# Patient Record
Sex: MALE | Race: WHITE | NOT HISPANIC OR LATINO | Employment: FULL TIME | URBAN - METROPOLITAN AREA
[De-identification: names, ages, dates, MRNs, and addresses within clinical notes are randomized per-mention and may not be internally consistent; named-entity substitution may affect disease eponyms.]

---

## 2017-06-21 ENCOUNTER — ALLSCRIPTS OFFICE VISIT (OUTPATIENT)
Dept: OTHER | Facility: OTHER | Age: 52
End: 2017-06-21

## 2017-12-12 ENCOUNTER — ALLSCRIPTS OFFICE VISIT (OUTPATIENT)
Dept: OTHER | Facility: OTHER | Age: 52
End: 2017-12-12

## 2017-12-12 DIAGNOSIS — L98.9 DISORDER OF SKIN OR SUBCUTANEOUS TISSUE: ICD-10-CM

## 2017-12-13 NOTE — PROGRESS NOTES
Assessment  1  Seborrheic keratosis (702 19) (L82 1)   2  Screening for skin condition (V82 0) (Z13 89)   3  Changing skin lesion (709 9) (L98 9)   4  H/O nonmelanoma skin cancer (V10 83) (Z85 828)    Plan     · Wound care as instructed ; Status:Complete;   Done: 09BYS1710   · (1) TISSUE EXAM; Status:Active - Retrospective By Protocol Authorization; Requestedfor:[id number]; B : right neck  B Date/Time: : 27BIZ3862  B : Skin Shave  A : right scalp  A Date/Time: : 33SON9172  A : Skin Shave  Impression: : hunt     · Use a sun block product with an SPF of 15 or more ; Status:Complete;   Done:10Boh5569   · Follow-up visit in 6 months Evaluation and Treatment  Follow-up  Status: Hold For -Scheduling  Requested for: 77Bng9894    Discussion/Summary  Discussion Summary- [facility] Derm:  Assessment #1: Skin lesions  Care Plan:  Probable basal cell carcinomas lesion on the scalp would require Mohs 1 on the neck probably could be excised here  Assessment #2: Seborrheic keratosis  Care Plan:  Patient reassured these are normal growths acquire with age no treatment needed  Assessment #3: History skin cancer  Care Plan:  No recurrence nothing else atypical sunblock recommended  Assessment #4: Screening for dermatologic disorders  Care Plan:  Nothing else noted on exam followup will continue to be more often once we get the results of the biopsies  Chief Complaint  Chief Complaint Free Text Note Form: 6 month check up      History of Present Illness  HPI: 76-year-old male with previous history of multiple skin cancers and Mohs surgery presents for overall checkup no specific complaints noted      Review of Systems  Complete Male Dermatology [address] [place] [address] [address] Patient:  Constitutional: Denies constitutional symptoms  Eyes: Denies eye symptoms  ENT:  denies ear symptoms, nasal symptoms, mouth or throat symptoms  Cardiovascular: Denies cardiovascular symptoms  Respiratory: Denies respiratory symptoms    Gastrointestinal: Denies gastrointestinal symptoms  Musculoskeletal: Denies musculoskeletal symptoms  Integumentary: Denies skin, hair and nail symptoms  Neurological: Denies neurologic symptoms  Psychiatric: Denies psychiatric symptoms  Endocrine: Denies endocrine symptoms  Hematologic/Lymphatic: Denies hematologic symptoms  Active Problems  1  Actinic keratosis (702 0) (L57 0)   2  Basal cell carcinoma of chest wall (173 51) (C44 519)   3  Basal cell carcinoma of chas of right ear (173 21) (C44 212)   4  Basal cell carcinoma of face (173 31) (C44 310)   5  Basal cell carcinoma of left shoulder (173 61) (C44 619)   6  Basal cell carcinoma of scalp (173 41) (C44 41)   7  Basal cell carcinoma, arm (173 61) (C44 611)   8  Changing skin lesion (709 9) (L98 9)   9  Diabetes Mellitus (250 00)   10  Hyperlipidemia (272 4) (E78 5)   11  Hypertension (401 9) (I10)   12  Screening for skin condition (V82 0) (Z13 89)   13  Seborrheic keratosis (702 19) (L82 1)   14  Skin lesion (709 9) (L98 9)   15  Skin lesion (709 9) (L98 9)   16  Squamous cell cancer of skin of left cheek (173 32) (C44 329)   17  Squamous cell carcinoma of scalp (173 42) (C44 42)    Past Medical History  1  History of Basal cell carcinoma of face (173 31) (C44 310)   2  History of Basal Cell Carcinoma Of Skin Of Trunk (173 51)   3  History of Basal Cell Carcinoma Of The Skin Of The Neck (173 41)   4  History of Benign neoplasm of skin of trunk (216 5) (D23 5)   5  History of Fibrotic Scar (709 2)   6  H/O nonmelanoma skin cancer (V10 83) (Z85 828)   7  History of Malignant Neoplasm Of Skin Of Lower Extremities (173 70)   8  History of Squamous Cell Carcinoma Of The Skin Of The Neck (173 42)  Past Medical History Reviewed- Derm:   The past medical history was reviewed  Surgical History  1  History of Excision Of Lesion Arms Malignant   2  History of Excision Of Lesion Face Malignant   3  History of Excision Of Lesion Legs Malignant   4   History of Excision Of Lesion Neck Malignant   5  History of Excision Of Lesion Nose Malignant   6  History of Excision Of Lesion Scalp Malignant   7  History of Excision Of Lesion Shoulders Malignant   8  History of Excision Of Lesion Trunk Malignant   9  History of Mohs Micrographic Surgery Head   10  History of Mohs Micrographic Surgery Nose Right Side   11  History of Mohs Micrographic Surgery Nose Right Side  Surgical History Reviewed [address] [place] [address] [address] Derm:   Surgical History reviewed      Family History  Family History    1  Family history of Malignant Melanoma Of The Skin (V16 8)  Family History Reviewed- Derm:   Family History was reviewed      Social History     · Never A Smoker  Social History Reviewed [facility]- Derm: The social history was reviewed      Current Meds   1  AmLODIPine Besylate 10 MG Oral Tablet; Therapy: 33FLV5143 to Recorded   2  Atenolol 50 MG Oral Tablet; Therapy: 74Mbf8881 to Recorded   3  BD Pen Needle Mini U/F 31G X 5 MM Miscellaneous; Therapy: 49JBX4572 to Recorded   4  Cialis 5 MG Oral Tablet; Therapy: 14Gwx8790 to Recorded   5  Farxiga 10 MG Oral Tablet Recorded   6  Fluorouracil 5 % External Cream; APPLY A THIN LAYER TO AFFECTED AREA L cheek TWICE DAILY; Therapy: 39MAO9698 to (Evaluate:20Jun2016)  Requested for: 00USN8184; Last Rx:44Ahh4482 Ordered   7  HumaLOG KwikPen 100 UNIT/ML Subcutaneous Solution Pen-injector; Therapy: 31Qes4565-(Evaluate:12Eze5047) Recorded   8  HumaLOG Mix 75/25 (75-25) 100 UNIT/ML Subcutaneous Suspension Recorded   9  Klor-Con M20 20 MEQ Oral Tablet Extended Release; Therapy: 83XWQ2369 to Recorded   10  Levemir FlexTouch 100 UNIT/ML Subcutaneous Solution Pen-injector; Therapy: 74VUS5038 to Recorded   11  MetFORMIN HCl  MG Oral Tablet Extended Release 24 Hour; Therapy: 65YBS9098 to Recorded   12  Pravastatin Sodium 20 MG Oral Tablet; Therapy: 91Mwj3408 to Recorded   13  Quinapril-Hydrochlorothiazide 20-12 5 MG Oral Tablet;   Therapy: 53WYR1620 to Recorded  Medication List Reviewed: The medication list was reviewed and updated today  Allergies    1  No Known Drug Allergies    Physical Exam   Constitutional  General appearance: Appears healthy and well developed  Lymphatic  No visible disturbance  Musculoskeletal  Digits and nails: No clubbing, cyanosis or edema  Cutaneous and nail exam normal    Skin  Scalp skin texture and hair distribution: Normal skin texture on scalp, normal hair distribution  Head: Abnormal    Neck: Normal turgor, no rashes, no lesions  Chest: Normal turgor, no rashes, no lesions  Abdomen: Normal turgor, no rashes, no lesions  Back: Normal turgor, no rashes, no lesions  Right upper extremity: Normal turgor, no rashes, no lesions  Left upper extremity: Normal turgor, no rashes, no lesions  Right lower extremity: Normal turgor, no rashes, no lesions  Left lower extremity: Normal turgor, no rashes, no lesions  Neuro/Psych  Alert and oriented x 3  Displays comfort and cooperation during encounterl  Affect is normal    Finding 8mm pearly macule R parietal scalp  5mm pearly macule R post auricular neck previous sites of skin cancer well healed normal keratotic papules with greasy stuck on appearance nothing else remarkable noted on complete exam       Procedure   Procedure: skin biopsy  Indications for the procedure include basal cell carcinoma suspected  Risks, benefits, alternatives, infection risk, bleeding risk, risk of allergic reaction and risk of scarring were discussed with the patient--   verbal consent was obtained prior to the procedure  Procedure Note:   Anesthesia: 1 ml of lidocaine 1% with epinephrine  The lesion was located on the Right anterior scalp  The patient was prepped using alcohol  a shave biopsy of the lesion was taken  The hemostasis of the wound was achieved with aluminum chloride  Dressing: The wound was cleaned and petroleum jelly was applied and a sterile compression dressing was placed    Specimen: the excised lesion was place in buffered formalin and sent for pathology  Skin Lesion #2:  Indications for the procedure include basal cell carcinoma suspected  Procedure Note: Anesthesia: 1 ml of lidocaine 1% without epinephrine  The lesion was located on the Right postauricular neck  The patient was prepped using alcohol  a shave biopsy of the lesion was taken  The hemostasis of the wound was achieved with electric cauterization and aluminum chloride  Dressing: The wound was cleaned and petroleum jelly was applied and a sterile compression dressing was placed  Specimen: the excised lesion was place in buffered formalin and sent for pathology  Post-Procedure:  Patient Status: the patient tolerated the procedure well  Complications: there were no complications  Follow-up in the office  patient will be called in event skin cancer is found  -- Patient can call the office in 7-10 days for results if not contacted  Future Appointments    Date/Time Provider Specialty Site   06/11/2018 11:20 AM ABBE Carson   [facility]       Signatures   Electronically signed by : Jillene Siemens, M D ; Dec 12 2017 12:16PM EST                       (Author)

## 2018-01-23 NOTE — PROCEDURES
Chief Complaint  SKIN CANCER SURGERY      History of Present Illness  HPI- Derm: 49-year-old male with previous history of skin cancer presents for followup for treatment of presumed basal cell carcinomas on his scalp and chest      Current Meds   1  AmLODIPine Besylate 10 MG Oral Tablet; Therapy: 27OSB7116 to Recorded   2  Atenolol 50 MG Oral Tablet; Therapy: 65Udf0624 to Recorded   3  BD Pen Needle Mini U/F 31G X 5 MM Miscellaneous; Therapy: 15VCM2524 to Recorded   4  Cialis 5 MG Oral Tablet; Therapy: 27Val5618 to Recorded   5  Farxiga 10 MG Oral Tablet Recorded   6  Farxiga 5 MG Oral Tablet; Therapy: 87GBA0986 to Recorded   7  HumaLOG KwikPen 100 UNIT/ML Subcutaneous Solution Pen-injector; Therapy: 73Obk3651-(Evaluate:97Jor1662) Recorded   8  HumaLOG Mix 75/25 (75-25) 100 UNIT/ML Subcutaneous Suspension Recorded   9  Klor-Con M20 20 MEQ Oral Tablet Extended Release; Therapy: 02WPO5652 to Recorded   10  Levemir FlexTouch 100 UNIT/ML Subcutaneous Solution Pen-injector; Therapy: 12OLF2958 to Recorded   11  MetFORMIN HCl  MG Oral Tablet Extended Release 24 Hour; Therapy: 09SCH7910 to Recorded   12  Pravastatin Sodium 20 MG Oral Tablet; Therapy: 83Ahx9121 to Recorded   13  Quinapril-Hydrochlorothiazide 20-12 5 MG Oral Tablet; Therapy: 45UTW9994 to Recorded    Allergies    1  No Known Drug Allergies    Physical Exam    Constitutional   General appearance: Appears healthy and well developed  Lymphatic   No visible disturbance  Musculoskeletal   Digits and nails: No clubbing, cyanosis or edema  Cutaneous and nail exam normal     Neuro/Psych   Alert and oriented x 3  Displays comfort and cooperation during encounterl  Affect is normal     Finding Several lesions noted on the left scalp and right chest wall measuring less than 4 mm in size  Procedure    Procedure: electrical destruction and cauterization of lesion     Risks, benefits, alternatives, infection risk, bleeding risk, risk of allergic reaction and the risk of scarring were discussed with the patient   verbal consent was obtained prior to the procedure  Procedure Note:   Anesthesia: 1 ml of lidocaine 1% with epinephrine  Left frontal scalp   Lesion size is 3 mm  The patient was prepped using alcohol  Destruction Technique: Curettage and electrodessication X3 performed with destruction with electrical cauterization application blank space defect  Defect size: 3 5mm  Dressing: The wound was cleaned and petroleum jelly was applied and a sterile dressing was placed  Specimen: the excised lesion was place in buffered formalin and sent for pathology  Skin Lesion #2:   Indications for the procedure include Presumed basal cell carcinoma  Procedure Note:  Anesthesia: Of lidocaine 1% with epinephrine  Left temporal scalp anterior   Lesion size is 3 mm  The patient was prepped and draped in the usual sterile fashion using alcohol  Destruction Technique: destruction with electrical cauterization application  Defect size: 3 5mm  Dressing: The wound was cleaned and petroleum jelly was applied, a sterile dressing was placed and Specimen sent for biopsy  Skin Lesion #3:   Indications for the procedure include Presumed basal cell carcinoma  Procedure Note:  Anesthesia: 1 ml of lidocaine 1% with epinephrine  Left temporal scalp posterior   Lesion size is 2 mm  The patient was prepped and draped in the usual sterile fashion using alcohol  Destruction Technique: destruction with electrical cauterization application  Defect size: 3mm  Dressing: The wound was cleaned and petroleum jelly was applied, a sterile dressing was placed and Specimen sent for biopsy  Skin Lesion #4:   Indications for the procedure include Presumed basal cell carcinoma  Procedure Note:  Anesthesia: 1 ml of lidocaine 1% with epinephrine  Left parietal scalp   Lesion size is 3 mm   The patient was prepped and draped in the usual sterile fashion using alcohol  Destruction Technique: destruction with electrical cauterization application  Defect size: 4mm  Dressing: The wound was cleaned and petroleum jelly was applied, a sterile dressing was placed and Specimen sent for biopsy  Skin Lesion #5:   Indications for the procedure include Previously biopsied basal cell carcinoma  Procedure Note:  Anesthesia: 1 ml of lidocaine 1% with epinephrine  Right chest wall   Lesion size is 3 mm  The patient was prepped and draped in the usual sterile fashion using alcohol  Destruction Technique: destruction with electrical cauterization application  Defect size: 4  Dressing: The wound was cleaned and petroleum jelly was applied, a sterile dressing was placed and Specimen sent for biopsy  Post-Procedure:   Patient Status:  the patient tolerated the procedure well  Complications:  there were no complications  Assessment    1  Changing skin lesion (709 9) (L98 9)    Plan  Changing skin lesion    · Wound care as instructed ; Status:Complete;   Done: 67NWU0892   · Follow-up visit in 3 months Evaluation and Treatment  Follow-up  Status: Complete   Done: 29RXF8324  Skin lesion    · (1) TISSUE EXAM; Status:Active; Requested RSY:[id number];   Impression? : BCC  Sources(s) : Skin, Other    Discussion/Summary    Lesions x5 treated for resumed cutaneous malignancy await result of biopsy to confirm this will recheck in 3 months        Signatures   Electronically signed by : ABBE Wilde ; Jan 26 2016  5:15PM EST                       (Author)

## 2018-02-06 ENCOUNTER — PROCEDURE VISIT (OUTPATIENT)
Dept: DERMATOLOGY | Facility: CLINIC | Age: 53
End: 2018-02-06
Payer: COMMERCIAL

## 2018-02-06 DIAGNOSIS — C44.41 BASAL CELL CARCINOMA OF RIGHT SIDE OF NECK: ICD-10-CM

## 2018-02-06 DIAGNOSIS — C44.41 BASAL CELL CARCINOMA OF SCALP: Primary | ICD-10-CM

## 2018-02-06 PROCEDURE — 17272 DSTR MAL LES S/N/H/F/G 1.1-2: CPT | Performed by: DERMATOLOGY

## 2018-02-06 RX ORDER — METFORMIN HYDROCHLORIDE 500 MG/1
TABLET, EXTENDED RELEASE ORAL
COMMUNITY
Start: 2014-09-24 | End: 2019-06-30 | Stop reason: ALTCHOICE

## 2018-02-06 RX ORDER — TADALAFIL 5 MG/1
TABLET ORAL
COMMUNITY
Start: 2014-09-18 | End: 2019-06-30 | Stop reason: ALTCHOICE

## 2018-02-06 RX ORDER — FLUOROURACIL 50 MG/G
CREAM TOPICAL
COMMUNITY
Start: 2016-05-31 | End: 2021-07-25

## 2018-02-06 RX ORDER — PRAVASTATIN SODIUM 20 MG
TABLET ORAL
COMMUNITY
Start: 2014-09-18

## 2018-02-06 RX ORDER — AMLODIPINE BESYLATE 10 MG/1
TABLET ORAL
COMMUNITY
Start: 2014-01-23

## 2018-02-06 RX ORDER — ATENOLOL 50 MG/1
TABLET ORAL
COMMUNITY
Start: 2014-09-18

## 2018-02-06 RX ORDER — QUINAPRIL HCL AND HYDROCHLOROTHIAZIDE 20; 12.5 MG/1; MG/1
1 TABLET ORAL 2 TIMES DAILY
COMMUNITY
Start: 2014-01-23

## 2018-02-06 RX ORDER — POTASSIUM CHLORIDE 20 MEQ/1
TABLET, EXTENDED RELEASE ORAL
COMMUNITY
Start: 2014-01-23 | End: 2019-06-30 | Stop reason: ALTCHOICE

## 2018-02-06 NOTE — PROGRESS NOTES
[address] [id number] [facility] DERMATOLOGY  [address] [address]     MRN: [id number] : 1965  Encounter: [id number]  Patient Information: Vale Jeff    Subjective:     49-year-old male presents for previously biopsied basal cell carcinoma right scalp and right neck     Objective: There were no vitals taken for this visit  Physical Exam:    General Appearance:    Alert, cooperative, no distress   Skin:    previous sites of biopsy revealed superficial appearing lesions that appear larger than previously noted 1 on the right scalp measures 15 mm in size on the 1 on the right neck measures 13 mm in size   Procedure: Curettage & Electrodessication  The reasons for the procedure were explained to the patient  The benefits and risks of the procedure were explained to the patient, including bleeding, infection, incomplete removal, prolonged anesthesia (weeks to months) and rarely nerve damage  The patient is aware that a scar will result from the procedure  The consent for the procedure was obtained verbally and in writing  Lesion Site:#1  Right scalp  Curetted Area Size (mm):  15  Lesion site #2  Right neck curetted area size 13 mm  Using a sterile curette, the appropriate area was curetted  The area was curetted and electrodesiccated x3  Final defect size:  Right scalp 16 mm                                Right neck 14 mm    The wound was left to heal by secondary intention  The wound was cleansed then covered with a dressing  Wound care instructions were verbally given and in writing  I performed the entire procedure  Patient tolerated procedure well  Assessment:     1  Basal cell carcinoma of scalp     2   Basal cell carcinoma of right side of neck           Plan:   Since lesions appear quite superficial the large we elected to go ahead and see if we can be able to eradicate this with curettage electrodesiccation will check closely for recurrence and recheck in 1 month      Prior to Admission medications    Medication Sig Start Date End Date Taking? Authorizing Provider   amLODIPine (NORVASC) 10 mg tablet Take by mouth 1/23/14  Yes Historical Provider, MD   atenolol (TENORMIN) 50 mg tablet Take by mouth 9/18/14  Yes Historical Provider, MD   fluorouracil (EFUDEX) 5 % cream Apply topically 5/31/16  Yes Historical Provider, MD   insulin detemir (LEVEMIR FLEXTOUCH) subcutaneous injection pen 100 units/mL Inject under the skin 3/26/14  Yes Historical Provider, MD   insulin lispro (HUMALOG KWIKPEN) 100 Units/mL SOPN Inject under the skin 2/12/15  Yes Historical Provider, MD   Insulin Pen Needle (B-D UF III MINI PEN NEEDLES) 31G X 5 MM MISC by Does not apply route 10/2/14  Yes Historical Provider, MD   metFORMIN (GLUCOPHAGE-XR) 500 mg 24 hr tablet Take by mouth 9/24/14  Yes Historical Provider, MD   potassium chloride (KLOR-CON M20) 20 mEq tablet Take by mouth 1/23/14  Yes Historical Provider, MD   pravastatin (PRAVACHOL) 20 mg tablet Take by mouth 9/18/14  Yes Historical Provider, MD   quinapril-hydrochlorothiazide (ACCURETIC) 20-12 5 MG per tablet Take by mouth 1/23/14  Yes Historical Provider, MD   tadalafil (CIALIS) 5 MG tablet Take by mouth 9/18/14  Yes Historical Provider, MD   Dapagliflozin Propanediol (FARXIGA) 10 MG TABS Take by mouth    Historical Provider, MD   insulin lispro protamine-insulin lispro (HUMALOG MIX 75/25) 100 units/mL Inject under the skin    Historical Provider, MD     No Known Allergies    Elton Reese MD  2/6/2018,10:34 AM    Portions of the record may have been created with voice recognition software   Occasional wrong word or "sound a like" substitutions may have occurred due to the inherent limitations of voice recognition software   Read the chart carefully and recognize, using context, where substitutions have occurred

## 2018-03-12 ENCOUNTER — CLINICAL SUPPORT (OUTPATIENT)
Dept: DERMATOLOGY | Facility: CLINIC | Age: 53
End: 2018-03-12

## 2018-03-12 DIAGNOSIS — C44.41 BASAL CELL CARCINOMA OF SCALP: Primary | ICD-10-CM

## 2018-03-12 DIAGNOSIS — C44.41 BASAL CELL CARCINOMA OF RIGHT SIDE OF NECK: ICD-10-CM

## 2018-03-12 PROCEDURE — 99024 POSTOP FOLLOW-UP VISIT: CPT | Performed by: DERMATOLOGY

## 2018-03-12 NOTE — PROGRESS NOTES
[address] [facility] [address] [facility] DERMATOLOGY  [address] [address]     MRN: [id number] : 1965  Encounter: [id number]  Patient Information: Lupe Jeff    Subjective:     69-year-old male presents for follow-up for previously curetted basal cell carcinomas on the scalp and right neck no problems noted     Objective: There were no vitals taken for this visit  Physical Exam:    General Appearance:    Alert, cooperative, no distress   Skin:    Previous sites of  Curettage well-healed without evidence of disease     Assessment:     1  Basal cell carcinoma of scalp     2  Basal cell carcinoma of right side of neck           Plan:    no further treatment needed follow-up as previously scheduled in       Prior to Admission medications    Medication Sig Start Date End Date Taking?  Authorizing Provider   amLODIPine (NORVASC) 10 mg tablet Take by mouth 14   Historical Provider, MD   atenolol (TENORMIN) 50 mg tablet Take by mouth 14   Historical Provider, MD   Dapagliflozin Propanediol (FARXIGA) 10 MG TABS Take by mouth    Historical Provider, MD   fluorouracil (EFUDEX) 5 % cream Apply topically 16   Historical Provider, MD   insulin detemir (LEVEMIR FLEXTOUCH) subcutaneous injection pen 100 units/mL Inject under the skin 3/26/14   Historical Provider, MD   insulin lispro (HUMALOG KWIKPEN) 100 Units/mL SOPN Inject under the skin 2/12/15   Historical Provider, MD   insulin lispro protamine-insulin lispro (HUMALOG MIX 75/25) 100 units/mL Inject under the skin    Historical Provider, MD   Insulin Pen Needle (B-D UF III MINI PEN NEEDLES) 31G X 5 MM MISC by Does not apply route 10/2/14   Historical Provider, MD   metFORMIN (GLUCOPHAGE-XR) 500 mg 24 hr tablet Take by mouth 14   Historical Provider, MD   potassium chloride (KLOR-CON M20) 20 mEq tablet Take by mouth 14   Historical Provider, MD   pravastatin (PRAVACHOL) 20 mg tablet Take by mouth 9/18/14   Historical Provider, MD   quinapril-hydrochlorothiazide (ACCURETIC) 20-12 5 MG per tablet Take by mouth 1/23/14   Historical Provider, MD   tadalafil (CIALIS) 5 MG tablet Take by mouth 9/18/14   Historical Provider, MD     No Known Allergies    Sara Sanchez MD  3/12/2018,11:32 AM    Portions of the record may have been created with voice recognition software   Occasional wrong word or "sound a like" substitutions may have occurred due to the inherent limitations of voice recognition software   Read the chart carefully and recognize, using context, where substitutions have occurred

## 2019-02-06 ENCOUNTER — OFFICE VISIT (OUTPATIENT)
Dept: DERMATOLOGY | Facility: CLINIC | Age: 54
End: 2019-02-06
Payer: COMMERCIAL

## 2019-02-06 DIAGNOSIS — L98.9 UNKNOWN SKIN LESION: Primary | ICD-10-CM

## 2019-02-06 DIAGNOSIS — Z13.89 SCREENING FOR SKIN CONDITION: ICD-10-CM

## 2019-02-06 DIAGNOSIS — Z85.828 HISTORY OF SKIN CANCER: ICD-10-CM

## 2019-02-06 DIAGNOSIS — L82.1 SEBORRHEIC KERATOSIS: ICD-10-CM

## 2019-02-06 PROCEDURE — 88305 TISSUE EXAM BY PATHOLOGIST: CPT | Performed by: PATHOLOGY

## 2019-02-06 PROCEDURE — 11103 TANGNTL BX SKIN EA SEP/ADDL: CPT | Performed by: DERMATOLOGY

## 2019-02-06 PROCEDURE — 99213 OFFICE O/P EST LOW 20 MIN: CPT | Performed by: DERMATOLOGY

## 2019-02-06 PROCEDURE — 11102 TANGNTL BX SKIN SINGLE LES: CPT | Performed by: DERMATOLOGY

## 2019-02-06 NOTE — PATIENT INSTRUCTIONS
Skin lesion probable basal cell carcinomas will require Mohs especially lesion left nasal tip and possibly nasal bridge will discuss once we have the results of biopsy also need to possibly consider systemic therapy patient with multiple lesions on his torso as well which will plan on treatment with curettage and biopsy in the future once the more serious lesions on the face will be taking care of  Seborrheic keratosis patient reassured these are normal growths we acquire with age no treatment needed  History of skin cancer in no recurrence nothing else atypical sunblock recommended follow-up in 3 months  Screening for dermatologic disorders nothing else of concern noted on complete exam follow-up in 3 months  Wound care instructions given to patient

## 2019-02-06 NOTE — PROGRESS NOTES
[facility] (formerly [facility])[3] DERMATOLOGY  [address]  [facility] [address]  [phone]  [phone]     MRN: [id number] : 1965  Encounter: [id number]  Patient Information: Manuel Dixon  Chief complaint:  Skin cancer checkup    History of present illness:  47-year-old male with multiple skin cancers previously presents after almost a year with numerous lesions that have developed  History reviewed  No pertinent past medical history  History reviewed  No pertinent surgical history  Social History   History   Alcohol Use    Yes     Comment: socially     History   Drug Use No     History   Smoking Status    Never Smoker   Smokeless Tobacco    Never Used     History reviewed  No pertinent family history  Meds/Allergies   No Known Allergies    Meds:  Prior to Admission medications    Medication Sig Start Date End Date Taking?  Authorizing Provider   amLODIPine (NORVASC) 10 mg tablet Take by mouth 14  Yes Historical Provider, MD   atenolol (TENORMIN) 50 mg tablet Take by mouth 14  Yes Historical Provider, MD   Dapagliflozin Propanediol (FARXIGA) 10 MG TABS Take by mouth   Yes Historical Provider, MD   insulin detemir (LEVEMIR FLEXTOUCH) subcutaneous injection pen 100 units/mL Inject under the skin 3/26/14  Yes Historical Provider, MD   insulin lispro (HUMALOG KWIKPEN) 100 Units/mL SOPN Inject under the skin 2/12/15  Yes Historical Provider, MD   insulin lispro protamine-insulin lispro (HUMALOG MIX 75/25) 100 units/mL Inject under the skin   Yes Historical Provider, MD   Insulin Pen Needle (B-D UF III MINI PEN NEEDLES) 31G X 5 MM MISC by Does not apply route 10/2/14  Yes Historical Provider, MD   metFORMIN (GLUCOPHAGE-XR) 500 mg 24 hr tablet Take by mouth 14  Yes Historical Provider, MD   pravastatin (PRAVACHOL) 20 mg tablet Take by mouth 14  Yes Historical Provider, MD   quinapril-hydrochlorothiazide (ACCURETIC) 20-12 5 MG per tablet Take by mouth 1/23/14  Yes Historical Provider, MD   tadalafil (CIALIS) 5 MG tablet Take by mouth 9/18/14  Yes Historical Provider, MD   fluorouracil (EFUDEX) 5 % cream Apply topically 5/31/16   Historical Provider, MD   potassium chloride (KLOR-CON M20) 20 mEq tablet Take by mouth 1/23/14   Historical Provider, MD       Subjective:     Review of Systems:    General: negative for - chills, fatigue, fever,  weight gain or weight loss  Psychological: negative for - anxiety, behavioral disorder, concentration difficulties, decreased libido, depression, irritability, memory difficulties, mood swings, sleep disturbances or suicidal ideation  ENT: negative for - hearing difficulties , nasal congestion, nasal discharge, oral lesions, sinus pain, sneezing, sore throat  Allergy and Immunology: negative for - hives, insect bite sensitivity,  Hematological and Lymphatic: negative for - bleeding problems, blood clots,bruising, swollen lymph nodes  Endocrine: negative for - hair pattern changes, hot flashes, malaise/lethargy, mood swings, palpitations, polydipsia/polyuria, skin changes, temperature intolerance or unexpected weight change  Respiratory: negative for - cough, hemoptysis, orthopnea, shortness of breath, or wheezing  Cardiovascular: negative for - chest pain, dyspnea on exertion, edema,  Gastrointestinal: negative for - abdominal pain, nausea/vomiting  Genito-Urinary: negative for - dysuria, incontinence, irregular/heavy menses or urinary frequency/urgency  Musculoskeletal: negative for - gait disturbance, joint pain, joint stiffness, joint swelling, muscle pain, muscular weakness  Dermatological:  As in HPI  Neurological: negative for confusion, dizziness, headaches, impaired coordination/balance, memory loss, numbness/tingling, seizures, speech problems, tremors or weakness       Objective: There were no vitals taken for this visit      Physical Exam:    General Appearance:    Alert, cooperative, no distress   Head: Normocephalic, without obvious abnormality, atraumatic           Skin:   A full skin exam was performed including scalp, head scalp, eyes, ears, nose, lips, neck, chest, axilla, abdomen, back, buttocks, bilateral upper extremities, bilateral lower extremities, hands, feet, fingers, toes, fingernails, and toenails 4 mm pearly papule noted left alar rim 5 mm pearly nodule noted on the left nasal bridge 5 mm pearly macule noted on the left upper parietal scalp 5 mm area noted on the frontal scalp 4 mm area noted on the left temple and 5 mm area on the right cheek numerous other lesions of note on his torso as well normal keratotic papules greasy stuck on appearance nothing else of concern noted                  Shave Biopsy Procedure Note    Pre-operative Diagnosis:  Rule out basal cell carcinoma    Plan:  1  Instructed to keep the wound dry and covered for 24 and clean thereafter  2  Warning signs of infection were reviewed  3  Recommended that the patient use OTC acetaminophen as needed for pain  4  Return  Pending results of biopsy(ies)    Locations:  Left nasal ala rim left nasal bridge left parietal scalp frontal scalp right temple and right cheek    Indications:  Suspicious lesions    Anesthesia: Lidocaine 1% without epinephrine without added sodium bicarbonate    Procedure Details     Patient informed of the risks (including bleeding and infection) and benefits of the   procedure and Verbal informed consent obtained  The lesion and surrounding area were given a sterile prep using alcohol and draped in the usual sterile fashion  A Blue blade razor was used to obtain a specimen  Hemostasis achieved with aluminum chloride  Petrolatum and a sterile dressing applied  The specimen was sent for pathologic examination  The patient tolerated the procedure(s) well  Complications:  none  Assessment:     1  Unknown skin lesion     2  Screening for skin condition     3  History of skin cancer     4  Seborrheic keratosis           Plan:   Skin lesion probable basal cell carcinomas will require Mohs especially lesion left nasal tip and possibly nasal bridge will discuss once we have the results of biopsy also need to possibly consider systemic therapy patient with multiple lesions on his torso as well which will plan on treatment with curettage and biopsy in the future once the more serious lesions on the face will be taking care of  Seborrheic keratosis patient reassured these are normal growths we acquire with age no treatment needed  History of skin cancer in no recurrence nothing else atypical sunblock recommended follow-up in 3 months  Screening for dermatologic disorders nothing else of concern noted on complete exam follow-up in 3 months    Norman Morales MD  2/6/2019,8:16 AM    Portions of the record may have been created with voice recognition software   Occasional wrong word or "sound a like" substitutions may have occurred due to the inherent limitations of voice recognition software   Read the chart carefully and recognize, using context, where substitutions have occurred

## 2019-03-13 ENCOUNTER — PROCEDURE VISIT (OUTPATIENT)
Dept: DERMATOLOGY | Facility: CLINIC | Age: 54
End: 2019-03-13
Payer: COMMERCIAL

## 2019-03-13 DIAGNOSIS — C44.310 BASAL CELL CARCINOMA (BCC) OF SKIN OF FACE, UNSPECIFIED PART OF FACE: Primary | ICD-10-CM

## 2019-03-13 PROCEDURE — 99212 OFFICE O/P EST SF 10 MIN: CPT | Performed by: DERMATOLOGY

## 2019-03-13 NOTE — PROGRESS NOTES
[address]  [address]  [facility] [address]  [phone]  [phone]     MRN: [id number] : 1965  Encounter: [id number]  Patient Information: Sky Jeff    Subjective:     78-year-old male presents follow-up secondary to previous history of basal cell carcinomas at last visit we had biopsied 6 lesions on his face and scalp all all positive for basal cell carcinoma  Patient presented today for planned excision of some of these lesions andprobable referral for Mohs     Objective: There were no vitals taken for this visit  Physical Exam:    General Appearance:    Alert, cooperative, no distress   Skin:  On further examination of the scalp and face numerous lesions seen probably greater than 20  Assessment:     1  Basal cell carcinoma (BCC) of skin of face, unspecified part of face           Plan:   Patient with numerous lesions on the face scalp glabella nares greater than 20 noted and does not include lesions also noted on the back and chest previously at present my feeling is I do not know exactly where on going to start with removal of the lesions and concerned that each 1 will have margin involvement or another lesion in the specimen  At present my feeling patient needs either Mohs surgery for individual lesions or consideration for [place]  Will discuss this further with Dr Falguni Flores  Prior to Admission medications    Medication Sig Start Date End Date Taking?  Authorizing Provider   amLODIPine (NORVASC) 10 mg tablet Take by mouth 14   Historical Provider, MD   atenolol (TENORMIN) 50 mg tablet Take by mouth 14   Historical Provider, MD   Dapagliflozin Propanediol (FARXIGA) 10 MG TABS Take by mouth    Historical Provider, MD   fluorouracil (EFUDEX) 5 % cream Apply topically 16   Historical Provider, MD   insulin detemir (LEVEMIR FLEXTOUCH) subcutaneous injection pen 100 units/mL Inject under the skin 3/26/14 Historical Provider, MD   insulin lispro (HUMALOG KWIKPEN) 100 Units/mL SOPN Inject under the skin 2/12/15   Historical Provider, MD   insulin lispro protamine-insulin lispro (HUMALOG MIX 75/25) 100 units/mL Inject under the skin    Historical Provider, MD   Insulin Pen Needle (B-D UF III MINI PEN NEEDLES) 31G X 5 MM MISC by Does not apply route 10/2/14   Historical Provider, MD   metFORMIN (GLUCOPHAGE-XR) 500 mg 24 hr tablet Take by mouth 9/24/14   Historical Provider, MD   potassium chloride (KLOR-CON M20) 20 mEq tablet Take by mouth 1/23/14   Historical Provider, MD   pravastatin (PRAVACHOL) 20 mg tablet Take by mouth 9/18/14   Historical Provider, MD   quinapril-hydrochlorothiazide (ACCURETIC) 20-12 5 MG per tablet Take by mouth 1/23/14   Historical Provider, MD   tadalafil (CIALIS) 5 MG tablet Take by mouth 9/18/14   Historical Provider, MD     No Known Allergies    Antonia Tomlin MD  3/13/2019,11:37 AM    Portions of the record may have been created with voice recognition software   Occasional wrong word or "sound a like" substitutions may have occurred due to the inherent limitations of voice recognition software   Read the chart carefully and recognize, using context, where substitutions have occurred

## 2019-03-20 ENCOUNTER — TELEPHONE (OUTPATIENT)
Dept: DERMATOLOGY | Facility: CLINIC | Age: 54
End: 2019-03-20

## 2019-06-29 PROCEDURE — 99284 EMERGENCY DEPT VISIT MOD MDM: CPT | Performed by: EMERGENCY MEDICINE

## 2019-06-29 PROCEDURE — 99285 EMERGENCY DEPT VISIT HI MDM: CPT

## 2019-06-30 ENCOUNTER — HOSPITAL ENCOUNTER (INPATIENT)
Facility: HOSPITAL | Age: 54
LOS: 1 days | Discharge: HOME | DRG: 440 | End: 2019-07-01
Attending: EMERGENCY MEDICINE | Admitting: INTERNAL MEDICINE
Payer: COMMERCIAL

## 2019-06-30 ENCOUNTER — APPOINTMENT (INPATIENT)
Dept: ULTRASOUND IMAGING | Facility: HOSPITAL | Age: 54
DRG: 440 | End: 2019-06-30
Payer: COMMERCIAL

## 2019-06-30 ENCOUNTER — APPOINTMENT (EMERGENCY)
Dept: CT IMAGING | Facility: HOSPITAL | Age: 54
DRG: 440 | End: 2019-06-30
Payer: COMMERCIAL

## 2019-06-30 DIAGNOSIS — K85.90 PANCREATITIS: Primary | ICD-10-CM

## 2019-06-30 PROBLEM — I10 ESSENTIAL HYPERTENSION: Status: ACTIVE | Noted: 2019-06-30

## 2019-06-30 PROBLEM — E78.5 HLD (HYPERLIPIDEMIA): Status: ACTIVE | Noted: 2019-06-30

## 2019-06-30 PROBLEM — Z79.4 TYPE 2 DIABETES MELLITUS WITH HYPERGLYCEMIA, WITH LONG-TERM CURRENT USE OF INSULIN (HCC): Status: ACTIVE | Noted: 2019-06-30

## 2019-06-30 PROBLEM — E11.65 TYPE 2 DIABETES MELLITUS WITH HYPERGLYCEMIA, WITH LONG-TERM CURRENT USE OF INSULIN (HCC): Status: ACTIVE | Noted: 2019-06-30

## 2019-06-30 LAB
ALBUMIN SERPL BCP-MCNC: 3.5 G/DL (ref 3.5–5)
ALP SERPL-CCNC: 66 U/L (ref 46–116)
ALT SERPL W P-5'-P-CCNC: 52 U/L (ref 12–78)
ANION GAP SERPL CALCULATED.3IONS-SCNC: 10 MMOL/L (ref 4–13)
ANION GAP SERPL CALCULATED.3IONS-SCNC: 9 MMOL/L (ref 4–13)
AST SERPL W P-5'-P-CCNC: 25 U/L (ref 5–45)
BACTERIA UR QL AUTO: ABNORMAL /HPF
BASOPHILS # BLD AUTO: 0.05 THOUSANDS/ΜL (ref 0–0.1)
BASOPHILS NFR BLD AUTO: 1 % (ref 0–1)
BILIRUB SERPL-MCNC: 0.7 MG/DL (ref 0.2–1)
BILIRUB UR QL STRIP: NEGATIVE
BUN SERPL-MCNC: 13 MG/DL (ref 5–25)
BUN SERPL-MCNC: 15 MG/DL (ref 5–25)
CALCIUM SERPL-MCNC: 8.7 MG/DL (ref 8.3–10.1)
CALCIUM SERPL-MCNC: 9.4 MG/DL (ref 8.3–10.1)
CHLORIDE SERPL-SCNC: 100 MMOL/L (ref 100–108)
CHLORIDE SERPL-SCNC: 97 MMOL/L (ref 100–108)
CHOLEST SERPL-MCNC: 165 MG/DL (ref 50–200)
CLARITY UR: CLEAR
CO2 SERPL-SCNC: 26 MMOL/L (ref 21–32)
CO2 SERPL-SCNC: 27 MMOL/L (ref 21–32)
COLOR UR: YELLOW
CREAT SERPL-MCNC: 0.8 MG/DL (ref 0.6–1.3)
CREAT SERPL-MCNC: 0.94 MG/DL (ref 0.6–1.3)
EOSINOPHIL # BLD AUTO: 0.19 THOUSAND/ΜL (ref 0–0.61)
EOSINOPHIL NFR BLD AUTO: 3 % (ref 0–6)
ERYTHROCYTE [DISTWIDTH] IN BLOOD BY AUTOMATED COUNT: 12.3 % (ref 11.6–15.1)
ERYTHROCYTE [DISTWIDTH] IN BLOOD BY AUTOMATED COUNT: 12.4 % (ref 11.6–15.1)
EST. AVERAGE GLUCOSE BLD GHB EST-MCNC: 203 MG/DL
GFR SERPL CREATININE-BSD FRML MDRD: 101 ML/MIN/1.73SQ M
GFR SERPL CREATININE-BSD FRML MDRD: 91 ML/MIN/1.73SQ M
GLUCOSE SERPL-MCNC: 169 MG/DL (ref 65–140)
GLUCOSE SERPL-MCNC: 273 MG/DL (ref 65–140)
GLUCOSE SERPL-MCNC: 310 MG/DL (ref 65–140)
GLUCOSE SERPL-MCNC: 310 MG/DL (ref 65–140)
GLUCOSE SERPL-MCNC: 336 MG/DL (ref 65–140)
GLUCOSE SERPL-MCNC: 382 MG/DL (ref 65–140)
GLUCOSE UR STRIP-MCNC: ABNORMAL MG/DL
HBA1C MFR BLD: 8.7 % (ref 4.2–6.3)
HCT VFR BLD AUTO: 44.1 % (ref 36.5–49.3)
HCT VFR BLD AUTO: 45.4 % (ref 36.5–49.3)
HDLC SERPL-MCNC: 37 MG/DL (ref 40–60)
HGB BLD-MCNC: 14.9 G/DL (ref 12–17)
HGB BLD-MCNC: 15.7 G/DL (ref 12–17)
HGB UR QL STRIP.AUTO: NEGATIVE
HOLD SPECIMEN: YES
IMM GRANULOCYTES # BLD AUTO: 0.06 THOUSAND/UL (ref 0–0.2)
IMM GRANULOCYTES NFR BLD AUTO: 1 % (ref 0–2)
KETONES UR STRIP-MCNC: NEGATIVE MG/DL
LDLC SERPL CALC-MCNC: 97 MG/DL (ref 0–100)
LEUKOCYTE ESTERASE UR QL STRIP: NEGATIVE
LIPASE SERPL-CCNC: 929 U/L (ref 73–393)
LYMPHOCYTES # BLD AUTO: 1.63 THOUSANDS/ΜL (ref 0.6–4.47)
LYMPHOCYTES NFR BLD AUTO: 23 % (ref 14–44)
MCH RBC QN AUTO: 29.9 PG (ref 26.8–34.3)
MCH RBC QN AUTO: 30.2 PG (ref 26.8–34.3)
MCHC RBC AUTO-ENTMCNC: 33.8 G/DL (ref 31.4–37.4)
MCHC RBC AUTO-ENTMCNC: 34.6 G/DL (ref 31.4–37.4)
MCV RBC AUTO: 87 FL (ref 82–98)
MCV RBC AUTO: 88 FL (ref 82–98)
MONOCYTES # BLD AUTO: 0.74 THOUSAND/ΜL (ref 0.17–1.22)
MONOCYTES NFR BLD AUTO: 10 % (ref 4–12)
NEUTROPHILS # BLD AUTO: 4.52 THOUSANDS/ΜL (ref 1.85–7.62)
NEUTS SEG NFR BLD AUTO: 62 % (ref 43–75)
NITRITE UR QL STRIP: NEGATIVE
NON-SQ EPI CELLS URNS QL MICRO: ABNORMAL /HPF
NONHDLC SERPL-MCNC: 128 MG/DL
NRBC BLD AUTO-RTO: 0 /100 WBCS
PH UR STRIP.AUTO: 5.5 [PH] (ref 4.5–8)
PLATELET # BLD AUTO: 149 THOUSANDS/UL (ref 149–390)
PLATELET # BLD AUTO: 164 THOUSANDS/UL (ref 149–390)
PMV BLD AUTO: 11.5 FL (ref 8.9–12.7)
PMV BLD AUTO: 11.6 FL (ref 8.9–12.7)
POTASSIUM SERPL-SCNC: 3.5 MMOL/L (ref 3.5–5.3)
POTASSIUM SERPL-SCNC: 3.8 MMOL/L (ref 3.5–5.3)
PROT SERPL-MCNC: 7.8 G/DL (ref 6.4–8.2)
PROT UR STRIP-MCNC: ABNORMAL MG/DL
RBC # BLD AUTO: 4.99 MILLION/UL (ref 3.88–5.62)
RBC # BLD AUTO: 5.2 MILLION/UL (ref 3.88–5.62)
RBC #/AREA URNS AUTO: ABNORMAL /HPF
SODIUM SERPL-SCNC: 134 MMOL/L (ref 136–145)
SODIUM SERPL-SCNC: 135 MMOL/L (ref 136–145)
SP GR UR STRIP.AUTO: 1.01 (ref 1–1.03)
TRIGL SERPL-MCNC: 156 MG/DL
UROBILINOGEN UR QL STRIP.AUTO: 0.2 E.U./DL
WBC # BLD AUTO: 7.19 THOUSAND/UL (ref 4.31–10.16)
WBC # BLD AUTO: 7.64 THOUSAND/UL (ref 4.31–10.16)
WBC #/AREA URNS AUTO: ABNORMAL /HPF

## 2019-06-30 PROCEDURE — 96361 HYDRATE IV INFUSION ADD-ON: CPT

## 2019-06-30 PROCEDURE — 80048 BASIC METABOLIC PNL TOTAL CA: CPT | Performed by: PHYSICIAN ASSISTANT

## 2019-06-30 PROCEDURE — 85027 COMPLETE CBC AUTOMATED: CPT | Performed by: PHYSICIAN ASSISTANT

## 2019-06-30 PROCEDURE — 80053 COMPREHEN METABOLIC PANEL: CPT | Performed by: EMERGENCY MEDICINE

## 2019-06-30 PROCEDURE — 74177 CT ABD & PELVIS W/CONTRAST: CPT

## 2019-06-30 PROCEDURE — 99223 1ST HOSP IP/OBS HIGH 75: CPT | Performed by: INTERNAL MEDICINE

## 2019-06-30 PROCEDURE — 80061 LIPID PANEL: CPT | Performed by: PHYSICIAN ASSISTANT

## 2019-06-30 PROCEDURE — 83690 ASSAY OF LIPASE: CPT | Performed by: EMERGENCY MEDICINE

## 2019-06-30 PROCEDURE — 96374 THER/PROPH/DIAG INJ IV PUSH: CPT

## 2019-06-30 PROCEDURE — 82948 REAGENT STRIP/BLOOD GLUCOSE: CPT

## 2019-06-30 PROCEDURE — 81001 URINALYSIS AUTO W/SCOPE: CPT

## 2019-06-30 PROCEDURE — 99254 IP/OBS CNSLTJ NEW/EST MOD 60: CPT | Performed by: INTERNAL MEDICINE

## 2019-06-30 PROCEDURE — 93005 ELECTROCARDIOGRAM TRACING: CPT

## 2019-06-30 PROCEDURE — 85025 COMPLETE CBC W/AUTO DIFF WBC: CPT | Performed by: EMERGENCY MEDICINE

## 2019-06-30 PROCEDURE — 76705 ECHO EXAM OF ABDOMEN: CPT

## 2019-06-30 PROCEDURE — 83036 HEMOGLOBIN GLYCOSYLATED A1C: CPT | Performed by: PHYSICIAN ASSISTANT

## 2019-06-30 PROCEDURE — 36415 COLL VENOUS BLD VENIPUNCTURE: CPT | Performed by: EMERGENCY MEDICINE

## 2019-06-30 RX ORDER — INSULIN GLARGINE 100 [IU]/ML
50 INJECTION, SOLUTION SUBCUTANEOUS
Status: DISCONTINUED | OUTPATIENT
Start: 2019-06-30 | End: 2019-07-01 | Stop reason: HOSPADM

## 2019-06-30 RX ORDER — ACETAMINOPHEN 325 MG/1
650 TABLET ORAL EVERY 6 HOURS PRN
Status: DISCONTINUED | OUTPATIENT
Start: 2019-06-30 | End: 2019-07-01 | Stop reason: HOSPADM

## 2019-06-30 RX ORDER — ONDANSETRON 2 MG/ML
4 INJECTION INTRAMUSCULAR; INTRAVENOUS ONCE
Status: COMPLETED | OUTPATIENT
Start: 2019-06-30 | End: 2019-06-30

## 2019-06-30 RX ORDER — SODIUM CHLORIDE, SODIUM LACTATE, POTASSIUM CHLORIDE, CALCIUM CHLORIDE 600; 310; 30; 20 MG/100ML; MG/100ML; MG/100ML; MG/100ML
150 INJECTION, SOLUTION INTRAVENOUS CONTINUOUS
Status: DISCONTINUED | OUTPATIENT
Start: 2019-06-30 | End: 2019-07-01 | Stop reason: HOSPADM

## 2019-06-30 RX ORDER — SODIUM CHLORIDE 9 MG/ML
125 INJECTION, SOLUTION INTRAVENOUS CONTINUOUS
Status: DISCONTINUED | OUTPATIENT
Start: 2019-06-30 | End: 2019-06-30

## 2019-06-30 RX ORDER — ACETAMINOPHEN 325 MG/1
975 TABLET ORAL ONCE
Status: COMPLETED | OUTPATIENT
Start: 2019-06-30 | End: 2019-06-30

## 2019-06-30 RX ORDER — ATENOLOL 50 MG/1
50 TABLET ORAL DAILY
Status: DISCONTINUED | OUTPATIENT
Start: 2019-06-30 | End: 2019-07-01 | Stop reason: HOSPADM

## 2019-06-30 RX ORDER — AMLODIPINE BESYLATE 10 MG/1
10 TABLET ORAL DAILY
Status: DISCONTINUED | OUTPATIENT
Start: 2019-06-30 | End: 2019-07-01 | Stop reason: HOSPADM

## 2019-06-30 RX ORDER — ONDANSETRON 2 MG/ML
4 INJECTION INTRAMUSCULAR; INTRAVENOUS EVERY 6 HOURS PRN
Status: DISCONTINUED | OUTPATIENT
Start: 2019-06-30 | End: 2019-07-01 | Stop reason: HOSPADM

## 2019-06-30 RX ORDER — MORPHINE SULFATE 4 MG/ML
4 INJECTION, SOLUTION INTRAMUSCULAR; INTRAVENOUS EVERY 4 HOURS PRN
Status: DISCONTINUED | OUTPATIENT
Start: 2019-06-30 | End: 2019-07-01 | Stop reason: HOSPADM

## 2019-06-30 RX ORDER — OXYCODONE HYDROCHLORIDE 5 MG/1
5 TABLET ORAL EVERY 4 HOURS PRN
Status: DISCONTINUED | OUTPATIENT
Start: 2019-06-30 | End: 2019-07-01 | Stop reason: HOSPADM

## 2019-06-30 RX ADMIN — ONDANSETRON 4 MG: 2 INJECTION INTRAMUSCULAR; INTRAVENOUS at 02:45

## 2019-06-30 RX ADMIN — IOHEXOL 100 ML: 350 INJECTION, SOLUTION INTRAVENOUS at 02:12

## 2019-06-30 RX ADMIN — ATENOLOL 50 MG: 50 TABLET ORAL at 08:05

## 2019-06-30 RX ADMIN — SODIUM CHLORIDE 125 ML/HR: 0.9 INJECTION, SOLUTION INTRAVENOUS at 05:12

## 2019-06-30 RX ADMIN — INSULIN GLARGINE 50 UNITS: 100 INJECTION, SOLUTION SUBCUTANEOUS at 21:51

## 2019-06-30 RX ADMIN — INSULIN LISPRO 2 UNITS: 100 INJECTION, SOLUTION INTRAVENOUS; SUBCUTANEOUS at 21:51

## 2019-06-30 RX ADMIN — SODIUM CHLORIDE 1000 ML: 0.9 INJECTION, SOLUTION INTRAVENOUS at 02:44

## 2019-06-30 RX ADMIN — ENOXAPARIN SODIUM 40 MG: 40 INJECTION SUBCUTANEOUS at 08:06

## 2019-06-30 RX ADMIN — AMLODIPINE BESYLATE 10 MG: 10 TABLET ORAL at 08:05

## 2019-06-30 RX ADMIN — SODIUM CHLORIDE, SODIUM LACTATE, POTASSIUM CHLORIDE, AND CALCIUM CHLORIDE 150 ML/HR: .6; .31; .03; .02 INJECTION, SOLUTION INTRAVENOUS at 17:06

## 2019-06-30 RX ADMIN — INSULIN LISPRO 30 UNITS: 100 INJECTION, SOLUTION INTRAVENOUS; SUBCUTANEOUS at 17:08

## 2019-06-30 RX ADMIN — INSULIN LISPRO 6 UNITS: 100 INJECTION, SOLUTION INTRAVENOUS; SUBCUTANEOUS at 17:07

## 2019-06-30 RX ADMIN — SODIUM CHLORIDE, SODIUM LACTATE, POTASSIUM CHLORIDE, AND CALCIUM CHLORIDE 150 ML/HR: .6; .31; .03; .02 INJECTION, SOLUTION INTRAVENOUS at 22:55

## 2019-06-30 RX ADMIN — ACETAMINOPHEN 975 MG: 325 TABLET, FILM COATED ORAL at 02:44

## 2019-06-30 NOTE — ASSESSMENT & PLAN NOTE
· Hold pravastatin for now as above  · Lipid panel 2/28/19: cholesterol 209, triglycerides 248, HDL 48, , non-

## 2019-06-30 NOTE — PLAN OF CARE
Problem: PAIN - ADULT  Goal: Verbalizes/displays adequate comfort level or baseline comfort level  Description  Interventions:  - Encourage patient to monitor pain and request assistance  - Assess pain using appropriate pain scale  - Administer analgesics based on type and severity of pain and evaluate response  - Implement non-pharmacological measures as appropriate and evaluate response  - Consider cultural and social influences on pain and pain management  - Notify physician/advanced practitioner if interventions unsuccessful or patient reports new pain  Outcome: Progressing     Problem: INFECTION - ADULT  Goal: Absence or prevention of progression during hospitalization  Description  INTERVENTIONS:  - Assess and monitor for signs and symptoms of infection  - Monitor lab/diagnostic results  - Monitor all insertion sites, i e  indwelling lines, tubes, and drains  - Monitor endotracheal (as able) and nasal secretions for changes in amount and color  - [place] appropriate cooling/warming therapies per order  - Administer medications as ordered  - Instruct and encourage patient and family to use good hand hygiene technique  - Identify and instruct in appropriate isolation precautions for identified infection/condition  Outcome: Progressing  Goal: Absence of fever/infection during neutropenic period  Description  INTERVENTIONS:  - Monitor WBC  - Implement neutropenic guidelines  Outcome: Progressing     Problem: SAFETY ADULT  Goal: Patient will remain free of falls  Description  INTERVENTIONS:  - Assess patient frequently for physical needs  -  Identify cognitive and physical deficits and behaviors that affect risk of falls    -  [place] fall precautions as indicated by assessment   - Educate patient/family on patient safety including physical limitations  - Instruct patient to call for assistance with activity based on assessment  - Modify environment to reduce risk of injury  - Consider OT/PT consult to assist with strengthening/mobility  Outcome: Progressing  Goal: Maintain or return to baseline ADL function  Description  INTERVENTIONS:  -  Assess patient's ability to carry out ADLs; assess patient's baseline for ADL function and identify physical deficits which impact ability to perform ADLs (bathing, care of mouth/teeth, toileting, grooming, dressing, etc )  - Assess/evaluate cause of self-care deficits   - Assess range of motion  - Assess patient's mobility; develop plan if impaired  - Assess patient's need for assistive devices and provide as appropriate  - Encourage maximum independence but intervene and supervise when necessary  ¯ Involve family in performance of ADLs  ¯ Assess for home care needs following discharge   ¯ Request OT consult to assist with ADL evaluation and planning for discharge  ¯ Provide patient education as appropriate  Outcome: Progressing  Goal: Maintain or return mobility status to optimal level  Description  INTERVENTIONS:  - Assess patient's baseline mobility status (ambulation, transfers, stairs, etc )    - Identify cognitive and physical deficits and behaviors that affect mobility  - Identify mobility aids required to assist with transfers and/or ambulation (gait belt, sit-to-stand, lift, walker, cane, etc )  - [place] fall precautions as indicated by assessment  - Record patient progress and toleration of activity level on Mobility SBAR; progress patient to next Phase/Stage  - Instruct patient to call for assistance with activity based on assessment  - Request Rehabilitation consult to assist with strengthening/weightbearing, etc   Outcome: Progressing     Problem: DISCHARGE PLANNING  Goal: Discharge to home or other facility with appropriate resources  Description  INTERVENTIONS:  - Identify barriers to discharge w/patient and caregiver  - Arrange for needed discharge resources and transportation as appropriate  - Identify discharge learning needs (meds, wound care, etc )  - Arrange for interpretive services to assist at discharge as needed  - Refer to Case Management Department for coordinating discharge planning if the patient needs post-hospital services based on physician/advanced practitioner order or complex needs related to functional status, cognitive ability, or social support system  Outcome: Progressing     Problem: Knowledge Deficit  Goal: Patient/family/caregiver demonstrates understanding of disease process, treatment plan, medications, and discharge instructions  Description  Complete learning assessment and assess knowledge base    Interventions:  - Provide teaching at level of understanding  - Provide teaching via preferred learning methods  Outcome: Progressing

## 2019-06-30 NOTE — H&P
H&P- Tucker Jeff 1965, 52 y o  male MRN: [id number]    Unit/Bed#: CORINE Encounter: [id number]    Primary Care Provider: Clint Lucero MD   Date and time admitted to hospital: 6/30/2019 12:06 AM    * Pancreatitis  Assessment & Plan  · Presents with abdominal pain x 5 days  · CT Ab/Pelvis- "Findings suggesting mild pancreatitis, as described above  Please see discussion  Clinical and laboratory correlation is suggested  There is no evidence of pancreatic pseudocyst at the present time "  · Lipase only 929  · LFTs within normal limits, do not suspect biliary cause  · Check RUQ US  · Reports only social alcohol use- approximately 3 alcoholic drinks/week  · Does not appear to be a SE of his chemotherapy agent  · ? Due to other medications- pravastatin, quinapril  · Lipid panel  · IVF  · NPO  · Pain management  · Consult GI  Essential hypertension  Assessment & Plan  · /81  · Continue amlodipine, atenolol  · Hold quinapril-HCTZ due to above  Type 2 diabetes mellitus with hyperglycemia, with long-term current use of insulin (HCC)  Assessment & Plan  No results found for: HGBA1C    No results for input(s): POCGLU in the last 72 hours  Blood Sugar Average: Last 72 hrs:    · Glucose 382  · Hold metformin, Dapagliflozin  · COntinue home Levemir, Humalog with SSI coverage  · Check A1c     HLD (hyperlipidemia)  Assessment & Plan  · Hold pravastatin for now as above  · Lipid panel 2/28/19: cholesterol 209, triglycerides 248, HDL 48, , non-  Basal cell carcinoma of scalp  Assessment & Plan  · Multiple recurring lesions of face/scalp  · Continue Vismodegib  · Continue outpatient dermatology follow up  VTE Prophylaxis: Enoxaparin (Lovenox)  / sequential compression device   Code Status: Full Code  POLST: POLST form is not discussed and not completed at this time  Discussion with family: Discussed with patient at bedside      Anticipated Length of Stay:  Patient will be admitted on an Inpatient basis with an anticipated length of stay of  Greater than 2 midnights  Justification for Hospital Stay: pancreatitis  Total Time for Visit, including Counseling / Coordination of Care: 45 minutes  Greater than 50% of this total time spent on direct patient counseling and coordination of care  Chief Complaint:   Abdominal pain  History of Present Illness:    Humza Pearson is a 52 y o  male, PMHx of HTN, HLD, DM, BCC, who presents with abdominal pain x5 days  Patient states that about 2 weeks ago, he started his new oral chemotherapy agent for his skin cancer  Then about a week later, he started having lower abdominal pain  He describes it as crampy in nature, 7-8/10 in severity tonight without radiation  He denies associated fever, chills, diaphoresis, nausea, vomiting, diarrhea, constipation, urinary symptoms  Patient reports having approximately 3 alcoholic  He states that he does not drink 1 consistent type of alcohol and so it varies from wine/beer/liquor  Patient states that he has been on quinapril and pravastatin for years without any other complications  He denies a history of gallstones  Review of Systems:    Review of Systems   Constitutional: Negative for chills, diaphoresis and fever  Respiratory: Negative for cough, shortness of breath and wheezing  Cardiovascular: Negative for chest pain and palpitations  Gastrointestinal: Positive for abdominal pain  Negative for constipation, diarrhea, nausea and vomiting  Genitourinary: Negative for dysuria, frequency, hematuria and urgency  Neurological: Negative for dizziness, light-headedness and headaches  All other systems reviewed and are negative        Past Medical and Surgical History:     Past Medical History:   Diagnosis Date    Essential hypertension 6/30/2019    HLD (hyperlipidemia) 6/30/2019    Pancreatitis 6/30/2019    Type 2 diabetes mellitus with hyperglycemia, with long-term current use of insulin ([facility] [address] ) 6/30/2019       History reviewed  No pertinent surgical history  Meds/Allergies:    Prior to Admission medications    Medication Sig Start Date End Date Taking? Authorizing Provider   amLODIPine (NORVASC) 10 mg tablet Take by mouth 1/23/14   Historical Provider, MD   atenolol (TENORMIN) 50 mg tablet Take by mouth 9/18/14   Historical Provider, MD   Dapagliflozin Propanediol (FARXIGA) 10 MG TABS Take by mouth    Historical Provider, MD   fluorouracil (EFUDEX) 5 % cream Apply topically 5/31/16   Historical Provider, MD   insulin detemir (LEVEMIR FLEXTOUCH) subcutaneous injection pen 100 units/mL Inject under the skin 3/26/14   Historical Provider, MD   insulin lispro (HUMALOG KWIKPEN) 100 Units/mL SOPN Inject under the skin 2/12/15   Historical Provider, MD   insulin lispro protamine-insulin lispro (HUMALOG MIX 75/25) 100 units/mL Inject under the skin    Historical Provider, MD   Insulin Pen Needle (B-D UF III MINI PEN NEEDLES) 31G X 5 MM MISC by Does not apply route 10/2/14   Historical Provider, MD   metFORMIN (GLUCOPHAGE-XR) 500 mg 24 hr tablet Take by mouth 9/24/14   Historical Provider, MD   potassium chloride (KLOR-CON M20) 20 mEq tablet Take by mouth 1/23/14   Historical Provider, MD   pravastatin (PRAVACHOL) 20 mg tablet Take by mouth 9/18/14   Historical Provider, MD   quinapril-hydrochlorothiazide (ACCURETIC) 20-12 5 MG per tablet Take by mouth 1/23/14   Historical Provider, MD   tadalafil (CIALIS) 5 MG tablet Take by mouth 9/18/14   Historical Provider, MD     I have reviewed home medications with patient personally  Allergies: No Known Allergies    Social History:     Marital Status: /Civil Union   Occupation: Unknown  Patient Pre-hospital Living Situation: Home  Patient Pre-hospital Level of Mobility: Independent  Patient Pre-hospital Diet Restrictions: None    Substance Use History:   Social History     Substance and Sexual Activity   Alcohol Use Yes Comment: socially     Social History     Tobacco Use   Smoking Status Never Smoker   Smokeless Tobacco Never Used     Social History     Substance and Sexual Activity   Drug Use No       Family History:    History reviewed  No pertinent family history  Physical Exam:     Vitals:   Blood Pressure: 143/81 (06/30/19 0247)  Pulse: 75 (06/30/19 0247)  Temperature: 98 3 °F (36 8 °C) (06/30/19 0010)  Temp Source: Oral (06/30/19 0010)  Respirations: 16 (06/30/19 0247)  Weight - Scale: 135 kg (298 lb 4 5 oz) (06/30/19 0011)  SpO2: 93 % (06/30/19 0247)    Physical Exam   Constitutional: He is oriented to person, place, and time  He appears well-developed and well-nourished  He is cooperative  He does not appear ill  No distress  HENT:   Head: Normocephalic and atraumatic  Eyes: Conjunctivae, EOM and lids are normal    Cardiovascular: Normal rate, regular rhythm, normal heart sounds and normal pulses  Pulmonary/Chest: Effort normal and breath sounds normal    Abdominal: Soft  Normal appearance and bowel sounds are normal  There is no tenderness  Musculoskeletal: Normal range of motion  Neurological: He is alert and oriented to person, place, and time  He has normal strength  He is not disoriented  No sensory deficit  Skin: Skin is warm, dry and intact  He is not diaphoretic  Psychiatric: He has a normal mood and affect  His speech is normal and behavior is normal  Cognition and memory are normal    Vitals reviewed  Additional Data:     Lab Results: I have personally reviewed pertinent reports        Results from last 7 days   Lab Units 06/30/19  0124   WBC Thousand/uL 7 19   HEMOGLOBIN g/dL 15 7   HEMATOCRIT % 45 4   PLATELETS Thousands/uL 164   NEUTROS PCT % 62   LYMPHS PCT % 23   MONOS PCT % 10   EOS PCT % 3     Results from last 7 days   Lab Units 06/30/19  0124   SODIUM mmol/L 134*   POTASSIUM mmol/L 3 5   CHLORIDE mmol/L 97*   CO2 mmol/L 27   BUN mg/dL 15   CREATININE mg/dL 0 94   ANION GAP mmol/L 10   CALCIUM mg/dL 9 4   ALBUMIN g/dL 3 5   TOTAL BILIRUBIN mg/dL 0 70   ALK PHOS U/L 66   ALT U/L 52   AST U/L 25   GLUCOSE RANDOM mg/dL 382*                       Imaging: I have personally reviewed pertinent reports  CT abdomen pelvis with contrast   Final Result by Layo Epps MD (06/30 0258)      Findings suggesting mild pancreatitis, as described above  Please see discussion  Clinical and laboratory correlation is suggested  There is no evidence of pancreatic pseudocyst at the present time  Workstation performed: [id number]             EKG, Pathology, and Other Studies Reviewed on Admission:   · EKG: Unavailable  Allscripts / Epic Records Reviewed: Yes     ** Please Note: This note has been constructed using a voice recognition system   **

## 2019-06-30 NOTE — ED PROVIDER NOTES
History  Chief Complaint   Patient presents with    Abdominal Pain     Pt  with severe right sided abdominal cramping, states just started new med two weeks ago and pain started approx  one week after and has been getting worse  States pain is worse at night after taking the medication, med is [employer] for basal cell cancer  (+) diabetes  HPI     Patient is a [address] [place] old male with 5 days of rlq pain  Takes vismodegib  No pulsatile abdominal mass to suggest AAA  No pain out of proportion to suggest ischemic colitis  No vomiting or diarrhea  No dysuria or hematuria to suggest pyelo  No chest pain, pleuritic chest pain or shortness of breath  Able to tolerate PO  Still passing gas/stools  No pulmonary symptoms  No tachypnea  No suprapubic or CVA tenderness  No fever/ruq pain/jaundice  Pain is achi, crampy, non radiating  [facility] [address] [place] [address], will image for abdominal pain  Of note, patient with mild pancreatitis, will admit  Prior to Admission Medications   Prescriptions Last Dose Informant Patient Reported? Taking?    Dapagliflozin Propanediol (FARXIGA) 10 MG TABS 6/29/2019 at Unknown time Self Yes Yes   Sig: Take by mouth   Insulin Pen Needle (B-D UF III MINI PEN NEEDLES) 31G X 5 MM MISC 6/29/2019 at Unknown time Self Yes Yes   Sig: by Does not apply route   amLODIPine (NORVASC) 10 mg tablet 6/29/2019 at Unknown time Self Yes Yes   Sig: Take by mouth   atenolol (TENORMIN) 50 mg tablet 6/29/2019 at Unknown time Self Yes Yes   Sig: Take by mouth   fluorouracil (EFUDEX) 5 % cream Unknown at Unknown time Self Yes No   Sig: Apply topically   insulin degludec (TRESIBA FLEXTOUCH) 100 units/mL injection pen 6/29/2019 at Unknown time  Yes Yes   Sig: Inject 50 Units under the skin daily at bedtime   insulin lispro (HUMALOG KWIKPEN) 100 Units/mL SOPN 6/29/2019 at Unknown time Self Yes Yes   Sig: Inject 30 Units under the skin 2 (two) times a day with meals    metFORMIN (GLUCOPHAGE) 500 mg tablet 6/29/2019 at Unknown time  Yes Yes   Sig: Take 1,000 mg by mouth 2 (two) times a day with meals   pravastatin (PRAVACHOL) 20 mg tablet 6/29/2019 at Unknown time Self Yes Yes   Sig: Take by mouth   quinapril-hydrochlorothiazide (ACCURETIC) 20-12 5 MG per tablet 6/29/2019 at Unknown time Self Yes Yes   Sig: Take 1 tablet by mouth 2 (two) times a day       Facility-Administered Medications: None       Past Medical History:   Diagnosis Date    Essential hypertension 6/30/2019    HLD (hyperlipidemia) 6/30/2019    Pancreatitis 6/30/2019    Type 2 diabetes mellitus with hyperglycemia, with long-term current use of insulin ([facility] ) 6/30/2019       History reviewed  No pertinent surgical history  History reviewed  No pertinent family history  I have reviewed and agree with the history as documented  Social History     Tobacco Use    Smoking status: Never Smoker    Smokeless tobacco: Never Used   Substance Use Topics    Alcohol use: Yes     Comment: socially    Drug use: No        Review of Systems   Gastrointestinal: Positive for abdominal pain  All other systems reviewed and are negative  Physical Exam  Physical Exam   Constitutional: He is oriented to person, place, and time  He appears well-developed and well-nourished  HENT:   Head: Normocephalic and atraumatic  Eyes: Pupils are equal, round, and reactive to light  EOM are normal    Neck: Normal range of motion  Neck supple  Cardiovascular: Normal rate, regular rhythm and normal heart sounds  No murmur heard  Pulmonary/Chest: Effort normal and breath sounds normal  No respiratory distress  He has no wheezes  Abdominal: Soft  Bowel sounds are normal  He exhibits no distension  There is no tenderness  Musculoskeletal: Normal range of motion  He exhibits no edema or tenderness  Neurological: He is alert and oriented to person, place, and time  No cranial nerve deficit  Coordination normal    Skin: Skin is warm and dry   He is not diaphoretic  No erythema  Psychiatric: He has a normal mood and affect  His behavior is normal    Nursing note and vitals reviewed        Vital Signs  ED Triage Vitals   Temperature Pulse Respirations Blood Pressure SpO2   06/30/19 0010 06/30/19 0010 06/30/19 0010 06/30/19 0010 06/30/19 0010   98 3 °F (36 8 °C) 81 18 (!) 217/103 97 %      Temp Source Heart Rate Source Patient Position - Orthostatic VS BP Location FiO2 (%)   06/30/19 0010 06/30/19 0010 06/30/19 0013 06/30/19 0010 --   Oral Monitor Sitting Right arm       Pain Score       06/30/19 0010       7           Vitals:    06/30/19 0010 06/30/19 0013 06/30/19 0247   BP: (!) 217/103 (!) 183/86 143/81   Pulse: 81  75   Patient Position - Orthostatic VS:  Sitting Sitting         Visual Acuity      ED Medications  Medications   amLODIPine (NORVASC) tablet 10 mg (has no administration in time range)   atenolol (TENORMIN) tablet 50 mg (has no administration in time range)   insulin glargine (LANTUS) subcutaneous injection 50 Units 0 5 mL (has no administration in time range)   insulin lispro (HumaLOG) 100 units/mL subcutaneous injection 30 Units (has no administration in time range)   ondansetron (ZOFRAN) injection 4 mg (has no administration in time range)   sodium chloride 0 9 % infusion (125 mL/hr Intravenous New Bag 6/30/19 0512)   enoxaparin (LOVENOX) subcutaneous injection 40 mg (has no administration in time range)   insulin lispro (HumaLOG) 100 units/mL subcutaneous injection 2-12 Units (has no administration in time range)   acetaminophen (TYLENOL) tablet 650 mg (has no administration in time range)   insulin lispro (HumaLOG) 100 units/mL subcutaneous injection 2-12 Units (has no administration in time range)   sodium chloride 0 9 % bolus 1,000 mL (1,000 mL Intravenous New Bag 6/30/19 0244)   acetaminophen (TYLENOL) tablet 975 mg (975 mg Oral Given 6/30/19 0244)   ondansetron (ZOFRAN) injection 4 mg (4 mg Intravenous Given 6/30/19 0245)   iohexol (OMNIPAQUE) 350 MG/ML injection (MULTI-DOSE) 100 mL (100 mL Intravenous Given 6/30/19 0212)       Diagnostic Studies  Results Reviewed     Procedure Component Value Units Date/Time    [place] draw [[id number]] Collected:  06/30/19 0124    Lab Status:  Final result Specimen:  Blood from Arm, Left Updated:  06/30/19 0253    Narrative: The following orders were created for panel order [place] draw  Procedure                               Abnormality         Status                     ---------                               -----------         ------                     Tylor Hamilton Top on hold[[id number]]                           Final result               Green / Black tube on hold[[id number]]                       Final result                 Please view results for these tests on the individual orders      Urine Microscopic [[id number]]  (Abnormal) Collected:  06/30/19 0150    Lab Status:  Final result Specimen:  Urine, Clean Catch Updated:  06/30/19 0249     RBC, UA 0-1 /hpf      WBC, UA 2-4 /hpf      Epithelial Cells Occasional /hpf      Bacteria, UA None Seen /hpf     Comprehensive metabolic panel [[id number]]  (Abnormal) Collected:  06/30/19 0124    Lab Status:  Final result Specimen:  Blood from Arm, Left Updated:  06/30/19 0146     Sodium 134 mmol/L      Potassium 3 5 mmol/L      Chloride 97 mmol/L      CO2 27 mmol/L      ANION GAP 10 mmol/L      BUN 15 mg/dL      Creatinine 0 94 mg/dL      Glucose 382 mg/dL      Calcium 9 4 mg/dL      AST 25 U/L      ALT 52 U/L      Alkaline Phosphatase 66 U/L      Total Protein 7 8 g/dL      Albumin 3 5 g/dL      Total Bilirubin 0 70 mg/dL      eGFR 91 ml/min/1 73sq m     Narrative:       [place] guidelines for Chronic Kidney Disease (CKD):     Stage 1 with normal or high GFR (GFR > 90 mL/min/1 73 square meters)    Stage 2 Mild CKD (GFR = 60-89 mL/min/1 73 square meters)    Stage 3A Moderate CKD (GFR = 45-59 mL/min/1 73 square meters)    Stage 3B Moderate CKD (GFR = 30-44 mL/min/1 73 square meters)    Stage 4 Severe CKD (GFR = 15-29 mL/min/1 73 square meters)    Stage 5 End Stage CKD (GFR <15 mL/min/1 73 square meters)  Note: GFR calculation is accurate only with a steady state creatinine    Lipase [[id number]]  (Abnormal) Collected:  06/30/19 0124    Lab Status:  Final result Specimen:  Blood from Arm, Left Updated:  06/30/19 0146     Lipase 929 u/L     ED Urine Macroscopic [[id number]]  (Abnormal) Collected:  06/30/19 0150    Lab Status:  Final result Specimen:  Urine Updated:  06/30/19 0142     Color, UA Yellow     Clarity, UA Clear     pH, UA 5 5     Leukocytes, UA Negative     Nitrite, UA Negative     Protein, UA Trace mg/dl      Glucose,  (1/2%) mg/dl      Ketones, UA Negative mg/dl      Urobilinogen, UA 0 2 E U /dl      Bilirubin, UA Negative     Blood, UA Negative     Specific Gravity, UA 1 010    Narrative:       CLINITEK RESULT    CBC and differential [[id number]] Collected:  06/30/19 0124    Lab Status:  Final result Specimen:  Blood from Arm, Left Updated:  06/30/19 0131     WBC 7 19 Thousand/uL      RBC 5 20 Million/uL      Hemoglobin 15 7 g/dL      Hematocrit 45 4 %      MCV 87 fL      MCH 30 2 pg      MCHC 34 6 g/dL      RDW 12 3 %      MPV 11 5 fL      Platelets 420 Thousands/uL      nRBC 0 /100 WBCs      Neutrophils Relative 62 %      Immat GRANS % 1 %      Lymphocytes Relative 23 %      Monocytes Relative 10 %      Eosinophils Relative 3 %      Basophils Relative 1 %      Neutrophils Absolute 4 52 Thousands/µL      Immature Grans Absolute 0 06 Thousand/uL      Lymphocytes Absolute 1 63 Thousands/µL      Monocytes Absolute 0 74 Thousand/µL      Eosinophils Absolute 0 19 Thousand/µL      Basophils Absolute 0 05 Thousands/µL                  CT abdomen pelvis with contrast   Final Result by Sunil Sims MD (06/30 0258)      Findings suggesting mild pancreatitis, as described above  Please see discussion    Clinical and laboratory correlation is suggested  There is no evidence of pancreatic pseudocyst at the present time  Workstation performed: [id number]                    Procedures  Procedures       ED Course  ED Course as of Jun 30 0710   Sun Jun 30, 2019   0259   Findings suggesting mild pancreatitis, as described above   Please see discussion  Ilana Jacobo and laboratory correlation is suggested  Bhavana Mccormack is no evidence of pancreatic pseudocyst at the present time  0300 PANCREAS: Bhavana Mormon is mild infiltration of the fat adjacent to the pancreas and inferior to the pancreatic head   This is concerning for mild pancreatitis   Clinical and laboratory correlation is recommended  Bhavana Mormon is no evidence of pancreatic pseudocyst   at this time  MDM    Disposition  Final diagnoses:   Pancreatitis     Time reflects when diagnosis was documented in both MDM as applicable and the Disposition within this note     Time User Action Codes Description Comment    6/30/2019  3:50 AM Lilia Lepe [K85 [id number]] Pancreatitis       ED Disposition     ED Disposition Condition Date/Time Comment    Admit Stable [place] Jun 30, 2019  3:50 AM Case was discussed with jesu ap and the patient's admission status was agreed to be Admission Status: inpatient status to the service of Dr Kamaljit Andujar           Follow-up Information    None         Current Discharge Medication List      CONTINUE these medications which have NOT CHANGED    Details   amLODIPine (NORVASC) 10 mg tablet Take by mouth      atenolol (TENORMIN) 50 mg tablet Take by mouth      Dapagliflozin Propanediol (FARXIGA) 10 MG TABS Take by mouth      insulin degludec (TRESIBA FLEXTOUCH) 100 units/mL injection pen Inject 50 Units under the skin daily at bedtime      insulin lispro (HUMALOG KWIKPEN) 100 Units/mL SOPN Inject 30 Units under the skin 2 (two) times a day with meals       Insulin Pen Needle (B-D UF III MINI PEN NEEDLES) 31G X 5 MM MISC by Does not apply route metFORMIN (GLUCOPHAGE) 500 mg tablet Take 1,000 mg by mouth 2 (two) times a day with meals      pravastatin (PRAVACHOL) 20 mg tablet Take by mouth      quinapril-hydrochlorothiazide (ACCURETIC) 20-12 5 MG per tablet Take 1 tablet by mouth 2 (two) times a day       fluorouracil (EFUDEX) 5 % cream Apply topically           No discharge procedures on file      ED Provider  Electronically Signed by           Heber Carney MD  06/30/19 9876

## 2019-06-30 NOTE — CONSULTS
Consultation - [facility]  Pavithra Duran 52 y o  male MRN: [id number]  Unit/Bed#: -01 Encounter: [id number]        Inpatient consult to gastroenterology  Consult performed by: Chinmay Valverde PA-C  Consult ordered by: Orlando Tracy PA-C          Reason for Consult / Principal Problem: acute pancreatitis    ASSESSMENT and PLAN:    Principal Problem:    Pancreatitis  Active Problems:    Basal cell carcinoma of scalp    Essential hypertension    HLD (hyperlipidemia)    Type 2 diabetes mellitus with hyperglycemia, with long-term current use of insulin ([address] )    #1  Acute pancreatitis: suspect possible medication related  Patient recently started a medication for basal cell carcinoma which is very new  There is no known side effect of pancreatitis but it is still going through trials  He started this 2 weeks ago  He reports having very similar symptoms after taking Januvia which he stopped shortly after  About 10 years ago he also had a similar episode in which she said they diagnosed him with a possibility of acute pancreatitis but they did not confirm anything at that time  He was doing very well for the last 10 years  He reports he has been having this pain off and on for the last 6 days however it was progressively getting worse  His pain would pretty much only cause him problems when he would go to lay at night  He was able to eat during the day without increased pain and do his regular activities  Lipase noted to be 929  White blood cell count within normal limits  Afebrile    CT scan showing some mild pancreatitis  -IV fluids  -advance to clear liquid diet  -recommend holding off on all alcohol use  -continue to monitor abdominal exam, white blood cell count, temperature, lipase  -consider repeat imaging with any worsening  -would recommend holding his basal cell carcinoma medication and discussing this with his dermatologist on Monday   -check a right upper quadrant ultrasound  -------------------------------------------------------------------------------------------------------------------    HPI:  This is a 59-year-old male with a history of type 2 diabetes, hyperlipidemia, basal cell carcinoma, and hypertension who presented to the emergency room due to abdominal pain  He reports that he recently started a medication for his basal cell carcinoma about 2 weeks ago  It is very new and is still undergoing trials  It does not have any side effects of pancreatitis that he knows of currently  He reports that about 6 days ago he started to have this right-sided abdominal discomfort that would come and go  He reports that it was not that significant  Over the last few nights he was having significant worsening pain when he would go to lie down at night after taking his medications  He stopped taking the basal cell carcinoma agent yesterday as he was concerned this was causing his symptoms  He reports he has had similar symptoms to this in the past   He reports about 10 years ago he was in the emergency room with a very similar right-sided abdominal pain and says that he was diagnosed with a possibility of pancreatitis but nothing was ever confirmed  He reports that he also in the past was switched from [place] to [place] for a short period of time and the [place] had cause similar pain so he stopped this and the pain went away  He reports he drinks socially about 2 beverages twice a week  He does report that he had some alcohol on Thursday but did not have any since Thursday  He denies any other medication changes  All other medications he has been on for a long period of time  He reports his bowel movements have been normal   He denies any nausea or vomiting  He denies any pain with eating  REVIEW OF SYSTEMS:    CONSTITUTIONAL: Denies any fever, chills, or rigors  Good appetite, and no recent weight loss  HEENT: No earache or tinnitus   Denies hearing loss or visual disturbances  CARDIOVASCULAR: No chest pain or palpitations  RESPIRATORY: Denies any cough, hemoptysis, shortness of breath or dyspnea on exertion  GASTROINTESTINAL: As noted in the History of Present Illness  GENITOURINARY: No problems with urination  Denies any hematuria or dysuria  NEUROLOGIC: No dizziness or vertigo, denies headaches  MUSCULOSKELETAL: Denies any muscle or joint pain  SKIN: Denies skin rashes or itching  ENDOCRINE: Denies excessive thirst  Denies intolerance to heat or cold  PSYCHOSOCIAL: Denies depression or anxiety  Denies any recent memory loss  Historical Information   Past Medical History:   Diagnosis Date    Essential hypertension 6/30/2019    HLD (hyperlipidemia) 6/30/2019    Pancreatitis 6/30/2019    Type 2 diabetes mellitus with hyperglycemia, with long-term current use of insulin ([facility] ) 6/30/2019     History reviewed  No pertinent surgical history  Social History   Social History     Substance and Sexual Activity   Alcohol Use Yes    Comment: socially     Social History     Substance and Sexual Activity   Drug Use No     Social History     Tobacco Use   Smoking Status Never Smoker   Smokeless Tobacco Never Used     History reviewed  No pertinent family history      Meds/Allergies     Medications Prior to Admission   Medication    amLODIPine (NORVASC) 10 mg tablet    atenolol (TENORMIN) 50 mg tablet    Dapagliflozin Propanediol (FARXIGA) 10 MG TABS    insulin degludec (TRESIBA FLEXTOUCH) 100 units/mL injection pen    insulin lispro (HUMALOG KWIKPEN) 100 Units/mL SOPN    Insulin Pen Needle (B-D UF III MINI PEN NEEDLES) 31G X 5 MM MISC    metFORMIN (GLUCOPHAGE) 500 mg tablet    pravastatin (PRAVACHOL) 20 mg tablet    quinapril-hydrochlorothiazide (ACCURETIC) 20-12 5 MG per tablet    fluorouracil (EFUDEX) 5 % cream     Current Facility-Administered Medications   Medication Dose Route Frequency    acetaminophen (TYLENOL) tablet 650 mg  650 mg Oral Q6H PRN    amLODIPine (NORVASC) tablet 10 mg  10 mg Oral Daily    atenolol (TENORMIN) tablet 50 mg  50 mg Oral Daily    enoxaparin (LOVENOX) subcutaneous injection 40 mg  40 mg Subcutaneous Daily    insulin glargine (LANTUS) subcutaneous injection 50 Units 0 5 mL  50 Units Subcutaneous HS    insulin lispro (HumaLOG) 100 units/mL subcutaneous injection 2-12 Units  2-12 Units Subcutaneous TID AC    insulin lispro (HumaLOG) 100 units/mL subcutaneous injection 2-12 Units  2-12 Units Subcutaneous HS    insulin lispro (HumaLOG) 100 units/mL subcutaneous injection 30 Units  30 Units Subcutaneous BID With Meals    ondansetron (ZOFRAN) injection 4 mg  4 mg Intravenous Q6H PRN    sodium chloride 0 9 % infusion  125 mL/hr Intravenous Continuous       No Known Allergies        Objective     Blood pressure 128/65, pulse 60, temperature 98 °F (36 7 °C), temperature source Oral, resp  rate 18, weight 135 kg (298 lb 4 5 oz), SpO2 95 %  No intake or output data in the 24 hours ending 06/30/19 1100      PHYSICAL EXAM:      General Appearance:   Alert, cooperative, no distress, appears stated age    HEENT:   Normocephalic, atraumatic, anicteric, no oropharyngeal thrush present      Neck:  Supple, symmetrical, trachea midline, no adenopathy;    thyroid: no enlargement/tenderness/nodules; no carotid  bruit or JVD    Lungs:   Clear to auscultation bilaterally; no rales, rhonchi or wheezing; respirations unlabored    Heart[de-identified]   S1 and S2 normal; regular rate and rhythm; no murmur, rub, or gallop     Abdomen:   Soft, right sided abdominal pain to palpation, obese, non-distended; normal bowel sounds; no masses, no organomegaly    Genitalia:   Deferred    Rectal:   Deferred    Extremities:  No cyanosis, clubbing or edema    Pulses:  2+ and symmetric all extremities    Skin:  Skin color, texture, turgor normal, no rashes or lesions    Lymph nodes:  No palpable cervical, axillary or inguinal lymphadenopathy        Lab Results:   Results from last 7 days   Lab Units 06/30/19  0653 06/30/19  0124   WBC Thousand/uL 7 64 7 19   HEMOGLOBIN g/dL 14 9 15 7   HEMATOCRIT % 44 1 45 4   PLATELETS Thousands/uL 149 164   NEUTROS PCT %  --  62   LYMPHS PCT %  --  23   MONOS PCT %  --  10   EOS PCT %  --  3     Results from last 7 days   Lab Units 06/30/19  0603 06/30/19  0124   POTASSIUM mmol/L 3 8 3 5   CHLORIDE mmol/L 100 97*   CO2 mmol/L 26 27   BUN mg/dL 13 15   CREATININE mg/dL 0 80 0 94   CALCIUM mg/dL 8 7 9 4   ALK PHOS U/L  --  66   ALT U/L  --  52   AST U/L  --  25         Results from last 7 days   Lab Units 06/30/19  0124   LIPASE u/L 929*       Imaging Studies: I have personally reviewed pertinent imaging studies  Ct Abdomen Pelvis With Contrast    Result Date: 6/30/2019  Impression: Findings suggesting mild pancreatitis, as described above  Please see discussion  Clinical and laboratory correlation is suggested  There is no evidence of pancreatic pseudocyst at the present time  Workstation performed: [id number]           Patient was seen and examined by Dr Valentino Stanford  All key medical decisions were made by Dr Valentino Stanford  Thank you for allowing us to participate in the care of this present patient  We will follow-up with you closely

## 2019-06-30 NOTE — ASSESSMENT & PLAN NOTE
· Presents with abdominal pain x 5 days  · CT Ab/Pelvis- "Findings suggesting mild pancreatitis, as described above  Please see discussion  Clinical and laboratory correlation is suggested  There is no evidence of pancreatic pseudocyst at the present time "  · Lipase only 929  · LFTs within normal limits, do not suspect biliary cause  · Check RUQ US  · Reports only social alcohol use- approximately 3 alcoholic drinks/week  · Does not appear to be a SE of his chemotherapy agent  · ? Due to other medications- pravastatin, quinapril  · Lipid panel  · IVF  · NPO  · Pain management  · Consult GI

## 2019-06-30 NOTE — ASSESSMENT & PLAN NOTE
· Multiple recurring lesions of face/scalp  · Continue Vismodegib  · Continue outpatient dermatology follow up

## 2019-06-30 NOTE — ASSESSMENT & PLAN NOTE
No results found for: HGBA1C    No results for input(s): POCGLU in the last 72 hours  Blood Sugar Average: Last 72 hrs:    · Glucose 382  · Hold metformin, Dapagliflozin  · COntinue home Levemir, Humalog with SSI coverage  · Check A1c

## 2019-07-01 VITALS
OXYGEN SATURATION: 96 % | DIASTOLIC BLOOD PRESSURE: 98 MMHG | RESPIRATION RATE: 18 BRPM | HEART RATE: 63 BPM | WEIGHT: 298.28 LBS | TEMPERATURE: 98.2 F | SYSTOLIC BLOOD PRESSURE: 161 MMHG

## 2019-07-01 PROBLEM — K85.90 PANCREATITIS: Status: RESOLVED | Noted: 2019-06-30 | Resolved: 2019-07-01

## 2019-07-01 LAB
ATRIAL RATE: 74 BPM
GLUCOSE SERPL-MCNC: 126 MG/DL (ref 65–140)
GLUCOSE SERPL-MCNC: 159 MG/DL (ref 65–140)
GLUCOSE SERPL-MCNC: 215 MG/DL (ref 65–140)
LIPASE SERPL-CCNC: 297 U/L (ref 73–393)
P AXIS: 58 DEGREES
PR INTERVAL: 176 MS
QRS AXIS: -22 DEGREES
QRSD INTERVAL: 94 MS
QT INTERVAL: 366 MS
QTC INTERVAL: 406 MS
T WAVE AXIS: 6 DEGREES
VENTRICULAR RATE: 74 BPM

## 2019-07-01 PROCEDURE — 99232 SBSQ HOSP IP/OBS MODERATE 35: CPT | Performed by: INTERNAL MEDICINE

## 2019-07-01 PROCEDURE — 99239 HOSP IP/OBS DSCHRG MGMT >30: CPT | Performed by: INTERNAL MEDICINE

## 2019-07-01 PROCEDURE — 93010 ELECTROCARDIOGRAM REPORT: CPT | Performed by: INTERNAL MEDICINE

## 2019-07-01 PROCEDURE — 82948 REAGENT STRIP/BLOOD GLUCOSE: CPT

## 2019-07-01 PROCEDURE — 83690 ASSAY OF LIPASE: CPT | Performed by: PHYSICIAN ASSISTANT

## 2019-07-01 RX ADMIN — SODIUM CHLORIDE, SODIUM LACTATE, POTASSIUM CHLORIDE, AND CALCIUM CHLORIDE 150 ML/HR: .6; .31; .03; .02 INJECTION, SOLUTION INTRAVENOUS at 05:58

## 2019-07-01 RX ADMIN — INSULIN LISPRO 30 UNITS: 100 INJECTION, SOLUTION INTRAVENOUS; SUBCUTANEOUS at 09:23

## 2019-07-01 RX ADMIN — INSULIN LISPRO 2 UNITS: 100 INJECTION, SOLUTION INTRAVENOUS; SUBCUTANEOUS at 09:23

## 2019-07-01 RX ADMIN — ACETAMINOPHEN 650 MG: 325 TABLET, FILM COATED ORAL at 03:29

## 2019-07-01 RX ADMIN — ENOXAPARIN SODIUM 40 MG: 40 INJECTION SUBCUTANEOUS at 09:24

## 2019-07-01 RX ADMIN — INSULIN LISPRO 30 UNITS: 100 INJECTION, SOLUTION INTRAVENOUS; SUBCUTANEOUS at 16:29

## 2019-07-01 RX ADMIN — ATENOLOL 50 MG: 50 TABLET ORAL at 09:23

## 2019-07-01 RX ADMIN — INSULIN LISPRO 4 UNITS: 100 INJECTION, SOLUTION INTRAVENOUS; SUBCUTANEOUS at 16:29

## 2019-07-01 RX ADMIN — AMLODIPINE BESYLATE 10 MG: 10 TABLET ORAL at 09:23

## 2019-07-01 NOTE — UTILIZATION REVIEW
Initial Clinical Review    Admission: Date/Time/Statement: 6/30/19 @ 0351     Orders Placed This Encounter   Procedures    Inpatient Admission (expected length of stay for this patient Order details is greater than two midnights)     Standing Status:   Standing     Number of Occurrences:   1     Order Specific Question:   Admitting Physician     Answer:   Smiley Harrison [[id number]]     Order Specific Question:   Level of Care     Answer:   Med Surg [[id number]]     Order Specific Question:   Estimated length of stay     Answer:   More than 2 Midnights     Order Specific Question:   Certification     Answer:   I certify that inpatient services are medically necessary for this patient for a duration of greater than two midnights  See H&P and MD Progress Notes for additional information about the patient's course of treatment  ED Arrival Information     Expected Arrival Acuity Means of Arrival Escorted By Service Admission Type    - 6/29/2019 23:59 Urgent Walk-In Family Member Hospitalist Urgent    Arrival Complaint    -        Chief Complaint   Patient presents with    Abdominal Pain     Pt  with severe right sided abdominal cramping, states just started new med two weeks ago and pain started approx  one week after and has been getting worse  States pain is worse at night after taking the medication, med is Erivedge for basal cell cancer  (+) diabetes  Assessment/Plan: this is a 52year old male from home to ED admitted as inpatient due to acute pancreatitis  Presented with 5 days of Right upper  quadrant pain  The night prior he had some alcohol (Thursday night) but is avoid alcohol since  The patient has been on [place] for years without difficulty  Last month the patient had transiently used Jardience for 2 days and had a similar pain which resolved when he stopped taking the medication  the patient went back to [place] again    Then just 2 weeks ago he was started on Vismodegib by his dermatologist for Basal Cell Carcinoma of the scalp  On exam is hypertensive with tenderness to RUQ and epigastrium, pain control in progress  Glucose elevated at 382 with history of diabetes on insulin, insulin with accu checks and SSI in progress  Lipase elevated at 929 and ct abdomen suggests mild pancreatitis  Pharmacy enlisted to determine if any drug interactions potentially exist   Pain control and IVF in progress  GI consulted  Per GI - Acute pancreatitis shortly after starting med to suppress basal cell carcinoma  I looked this med up and didn't see reports of pancreatitis but could be due to this med vs DM medication he is on as well  Other diff includes gallstones- an US is pending  ED Triage Vitals   Temperature Pulse Respirations Blood Pressure SpO2   06/30/19 0010 06/30/19 0010 06/30/19 0010 06/30/19 0010 06/30/19 0010   98 3 °F (36 8 °C) 81 18 (!) 217/103 97 %      Temp Source Heart Rate Source Patient Position - Orthostatic VS BP Location FiO2 (%)   06/30/19 0010 06/30/19 0010 06/30/19 0013 06/30/19 0010 --   Oral Monitor Sitting Right arm       Pain Score       06/30/19 0010       7        Wt Readings from Last 1 Encounters:   06/30/19 135 kg (298 lb 4 5 oz)     Additional Vital Signs:   07/01/19 0723  98 3 °F (36 8 °C)  73  18  159/88  118  95 %  None (Room air)  Lying   06/30/19 2209  99 6 °F (37 6 °C)  67  18  138/82  --  94 %  None (Room air)  Lying   06/30/19 1541  98 7 °F (37 1 °C)  65  18  148/60  --  93 %  None (Room air)  Lying   06/30/19 0712  98 °F (36 7 °C)  60  18  128/65  88  95 %  None (Room air)  Lying   06/30/19 0247  --  75  16  143/81  --  93 %  None (Room air)  Sitting   06/30/19 0013  --  --  --  183/86Abnormal                Pertinent Labs/Diagnostic Test Results:   6/30/2019 US RUQ- Limited by body habitus and bowel gas  1   Normal gallbladder    2   Enlarged echogenic liver possibly representing hepatic steatosis    6/30/2019 CT abdomen - Findings suggesting mild pancreatitis  Results from last 7 days   Lab Units 06/30/19  0653 06/30/19  0124   WBC Thousand/uL 7 64 7 19   HEMOGLOBIN g/dL 14 9 15 7   HEMATOCRIT % 44 1 45 4   PLATELETS Thousands/uL 149 164   NEUTROS ABS Thousands/µL  --  4 52     Results from last 7 days   Lab Units 06/30/19  0603 06/30/19  0124   SODIUM mmol/L 135* 134*   POTASSIUM mmol/L 3 8 3 5   CHLORIDE mmol/L 100 97*   CO2 mmol/L 26 27   ANION GAP mmol/L 9 10   BUN mg/dL 13 15   CREATININE mg/dL 0 80 0 94   EGFR ml/min/1 73sq m 101 91   CALCIUM mg/dL 8 7 9 4     Results from last 7 days   Lab Units 06/30/19  0124   AST U/L 25   ALT U/L 52   ALK PHOS U/L 66   TOTAL PROTEIN g/dL 7 8   ALBUMIN g/dL 3 5   TOTAL BILIRUBIN mg/dL 0 70     Results from last 7 days   Lab Units 07/01/19  0722 06/30/19  2118 06/30/19  1547 06/30/19  1119 06/30/19  0715   POC GLUCOSE mg/dl 159* 169* 273* 310* 336*     Results from last 7 days   Lab Units 06/30/19  0603 06/30/19  0124   GLUCOSE RANDOM mg/dL 310* 382*     Results from last 7 days   Lab Units 06/30/19  0604   HEMOGLOBIN A1C % 8 7*   EAG mg/dl 203       Results from last 7 days   Lab Units 07/01/19  0501 06/30/19  0124   LIPASE u/L 297 929*     Results from last 7 days   Lab Units 06/30/19  0150   CLARITY UA  Clear   COLOR UA  Yellow   SPEC GRAV UA  1 010   PH UA  5 5   GLUCOSE UA mg/dl 500 (1/2%)*   KETONES UA mg/dl Negative   BLOOD UA  Negative   PROTEIN UA mg/dl Trace*   NITRITE UA  Negative   BILIRUBIN UA  Negative   UROBILINOGEN UA E U /dl 0 2   LEUKOCYTES UA  Negative   WBC UA /hpf 2-4*   RBC UA /hpf 0-1*   BACTERIA UA /hpf None Seen   EPITHELIAL CELLS WET PREP /hpf Occasional     ED Treatment:   Medication Administration from 06/29/2019 6835 to 06/30/2019 0445       Date/Time Order Dose Route Action Comments     06/30/2019 0244 sodium chloride 0 9 % bolus 1,000 mL 1,000 mL Intravenous New Bag      06/30/2019 0244 acetaminophen (TYLENOL) tablet 975 mg 975 mg Oral Given      06/30/2019 0245 ondansetron (ZOFRAN) injection 4 mg 4 mg Intravenous Given         Past Medical History:   Diagnosis Date    Essential hypertension 6/30/2019    HLD (hyperlipidemia) 6/30/2019    Pancreatitis 6/30/2019    Type 2 diabetes mellitus with hyperglycemia, with long-term [facility] 6/30/2019     Present on Admission:   Basal cell carcinoma of scalp   Pancreatitis   Essential hypertension   HLD (hyperlipidemia)    Admitting Diagnosis: Pancreatitis [K85 90]  Abdominal pain [R10 9]  Age/Sex: 52 y o  male  Admission Orders: 6/30/2019  035o INPATIENT     Current Facility-Administered Medications:  Acetaminophen - used x 1 650 mg Oral Q6H PRN    amLODIPine 10 mg Oral Daily    atenolol 50 mg Oral Daily    enoxaparin 40 mg Subcutaneous Daily    insulin glargine 50 Units Subcutaneous HS    insulin lispro 2-12 Units Subcutaneous TID AC    insulin lispro 2-12 Units Subcutaneous HS    insulin lispro 30 Units Subcutaneous BID With Meals    lactated ringers 150 mL/hr Intravenous Continuous Last Rate: 150 mL/hr (07/01/19 0558)   morphine injection 4 mg Intravenous Q4H PRN    ondansetron 4 mg Intravenous Q6H PRN    oxyCODONE 5 mg Oral Q4H PRN        IP CONSULT TO GASTROENTEROLOGY   [facility]    Network Utilization Review Department  Phone: [phone]; Fax [phone]  [email or web address]  ATTENTION: Please call with any questions or concerns to [phone]  and carefully listen to the prompts so that you are directed to the right person  Send all requests for admission clinical reviews, approved or denied determinations and any other requests to fax [phone]   All voicemails are confidential

## 2019-07-01 NOTE — DISCHARGE SUMMARY
Discharge Summary - [facility]    Patient Information: Rebekah Peng 52 y o  male MRN: [id number]  Unit/Bed#: -01 Encounter: [id number]    Discharging Physician / Practitioner: Sergei Andrade DO  PCP: Neville Monk MD  Admission Date: 6/30/2019  Discharge Date: 07/01/19    Disposition:     Home    Reason for Admission: Upper Abdomen pain    Discharge Diagnoses:     Principal Problem (Resolved): Pancreatitis  Active Problems:    Basal cell carcinoma of scalp    Essential hypertension    HLD (hyperlipidemia)    Type 2 diabetes mellitus with hyperglycemia, with long-term current use of insulin ([facility] )      Consultations During Hospital Stay:  · Gastroenterology    Procedures Performed:     · None    Significant Findings / Test Results:     · CT abdomen pelvis with contrast 06/30/2019 - findings suggesting mild pancreatitis with mild inflammation of the fat adjacent to the pancreas and inferior to the pancreatic head  This is concerning for mild pancreatitis  Clinical and laboratory correlation is recommended  There is no evidence for pancreatic pseudocyst   · Ultrasound abdomen - normal gallbladder  Enlarged echogenic liver possibly representing hepatic steatosis  · Lipid profile shows cholesterol 165, triglycerides 156, HDL 37, LDL 97   · Lipase on admission was 929  On the day of discharge is down to 297  LFTs are all within normal limits  · Metabolic profile grossly normal aside from sodium of 134-135  BUN and creatinine were normal     · Hemoglobin A1c is 8 7%  · Urinalysis was negative aside from glucose and trace protein  Incidental Findings:   · None     Test Results Pending at Discharge (will require follow up):    · None     Outpatient Tests Requested:  · Lipase in 1 week - copy to PCP    Complications:  None    Hospital Course:     Rebekah Peng is a 52 y o  male patient who originally presented to the hospital on 6/30/2019 due to pain in the abdomen mainly in the right upper quadrant location  The patient began having the pain Friday just 1 night after drinking some alcohol on Thursday night  The patient had pain for a couple of days but then as was worsening and absolutely not improving, the patient decided to come to the hospital   On evaluation in the emergency department there was some evidence for pancreatitis on the CT scan done initially  Additionally, the patient's lipase was just over 900  Decision was made to admit the patient into the hospital   He was placed on IV fluids and initially kept NPO  Later in the day on 06/30/2019 the patient's diet was advanced to clear liquids and he is maintained on this diet until 07/01/2019  The patient's diet was then advanced to low-fat diet when it was determined that his lipase was now normalized down to 297  Additionally, the patient's pain symptoms were all completely gone  The patient had evaluation including a check on triglyceride levels which were not terribly high in the setting of a diabetic patient, an ultrasound of the abdomen which was negative for gallstones, and an evaluation of his medications which he will did possible contribution of [place] and even a potential interaction / combined risk of pancreatitis with [place] and Vismodegib which he was started on just 2 weeks ago  The literature on the Visodegib is not as robust as that on [place], and others in this class  As the [place] is being used predominantly as adjunctive therapy, we are recommending discontinuation of this medication at the present time  Additionally, the patient is asked to hold his Vismodegib and talk to his dermatologist about alternative therapy for his basal cell carcinoma  The patient will have his lipase level checked again in 1 week to make sure that is remaining normal   Additionally he is advised to avoid any alcohol consumption at the present time    The patient does have some hepatic steatosis and low-fat diet is recommended  Condition at Discharge: stable     Discharge Day Visit / Exam:     Subjective: The patient feels great  His lipase normalized  He has no nausea vomiting  The patient had his diet advanced earlier and has had a couple meals now with more solid foods and is doing great with them  The patient is currently cleared for discharge from gastroenterology and Medicine Services  The patient is happy about a plan for discharged tonight  Vitals: Blood Pressure: 161/98 (07/01/19 1449)  Pulse: 63 (07/01/19 1449)  Temperature: 98 2 °F (36 8 °C) (07/01/19 1449)  Temp Source: Oral (07/01/19 1449)  Respirations: 18 (07/01/19 1449)  Weight - Scale: 135 kg (298 lb 4 5 oz) (06/30/19 0011)  SpO2: 96 % (07/01/19 1449)  Exam:   Physical Exam   Constitutional: He is oriented to person, place, and time  No distress  HENT:   Mouth/Throat: Oropharynx is clear and moist  No oropharyngeal exudate  Eyes: Pupils are equal, round, and reactive to light  Cardiovascular: Normal rate and regular rhythm  No murmur heard  Pulmonary/Chest: Effort normal  He has no wheezes  He has no rales  Abdominal: Soft  Bowel sounds are normal  He exhibits no distension  There is no tenderness  Musculoskeletal: He exhibits no edema  Neurological: He is alert and oriented to person, place, and time  Vitals reviewed  Discussion with Family:  Family at bedside during my visit    Discharge instructions/Information to patient and family:   See after visit summary for information provided to patient and family  Provisions for Follow-Up Care:  See after visit summary for information related to follow-up care and any pertinent home health orders  Planned Readmission:  None     Discharge Statement:  I spent 31 minutes discharging the patient  This time was spent on the day of discharge  I had direct contact with the patient on the day of discharge   Greater than 50% of the total time was spent examining patient, answering all patient questions, arranging and discussing plan of care with patient as well as directly providing post-discharge instructions  Additional time then spent on discharge activities  Discharge Medications:  See after visit summary for reconciled discharge medications provided to patient and family        ** Please Note: This note has been constructed using a voice recognition system **

## 2019-07-01 NOTE — PLAN OF CARE
Problem: PAIN - ADULT  Goal: Verbalizes/displays adequate comfort level or baseline comfort level  Description  Interventions:  - Encourage patient to monitor pain and request assistance  - Assess pain using appropriate pain scale  - Administer analgesics based on type and severity of pain and evaluate response  - Implement non-pharmacological measures as appropriate and evaluate response  - Consider cultural and social influences on pain and pain management  - Notify physician/advanced practitioner if interventions unsuccessful or patient reports new pain  Outcome: Progressing     Problem: INFECTION - ADULT  Goal: Absence or prevention of progression during hospitalization  Description  INTERVENTIONS:  - Assess and monitor for signs and symptoms of infection  - Monitor lab/diagnostic results  - Monitor all insertion sites, i e  indwelling lines, tubes, and drains  - Monitor endotracheal (as able) and nasal secretions for changes in amount and color  - [place] appropriate cooling/warming therapies per order  - Administer medications as ordered  - Instruct and encourage patient and family to use good hand hygiene technique  - Identify and instruct in appropriate isolation precautions for identified infection/condition  Outcome: Progressing  Goal: Absence of fever/infection during neutropenic period  Description  INTERVENTIONS:  - Monitor WBC  - Implement neutropenic guidelines  Outcome: Progressing     Problem: SAFETY ADULT  Goal: Patient will remain free of falls  Description  INTERVENTIONS:  - Assess patient frequently for physical needs  -  Identify cognitive and physical deficits and behaviors that affect risk of falls    -  [place] fall precautions as indicated by assessment   - Educate patient/family on patient safety including physical limitations  - Instruct patient to call for assistance with activity based on assessment  - Modify environment to reduce risk of injury  - Consider OT/PT consult to assist with strengthening/mobility  Outcome: Progressing  Goal: Maintain or return to baseline ADL function  Description  INTERVENTIONS:  -  Assess patient's ability to carry out ADLs; assess patient's baseline for ADL function and identify physical deficits which impact ability to perform ADLs (bathing, care of mouth/teeth, toileting, grooming, dressing, etc )  - Assess/evaluate cause of self-care deficits   - Assess range of motion  - Assess patient's mobility; develop plan if impaired  - Assess patient's need for assistive devices and provide as appropriate  - Encourage maximum independence but intervene and supervise when necessary  ¯ Involve family in performance of ADLs  ¯ Assess for home care needs following discharge   ¯ Request OT consult to assist with ADL evaluation and planning for discharge  ¯ Provide patient education as appropriate  Outcome: Progressing  Goal: Maintain or return mobility status to optimal level  Description  INTERVENTIONS:  - Assess patient's baseline mobility status (ambulation, transfers, stairs, etc )    - Identify cognitive and physical deficits and behaviors that affect mobility  - Identify mobility aids required to assist with transfers and/or ambulation (gait belt, sit-to-stand, lift, walker, cane, etc )  - [place] fall precautions as indicated by assessment  - Record patient progress and toleration of activity level on Mobility SBAR; progress patient to next Phase/Stage  - Instruct patient to call for assistance with activity based on assessment  - Request Rehabilitation consult to assist with strengthening/weightbearing, etc   Outcome: Progressing     Problem: DISCHARGE PLANNING  Goal: Discharge to home or other facility with appropriate resources  Description  INTERVENTIONS:  - Identify barriers to discharge w/patient and caregiver  - Arrange for needed discharge resources and transportation as appropriate  - Identify discharge learning needs (meds, wound care, etc )  - Arrange for interpretive services to assist at discharge as needed  - Refer to Case Management Department for coordinating discharge planning if the patient needs post-hospital services based on physician/advanced practitioner order or complex needs related to functional status, cognitive ability, or social support system  Outcome: Progressing     Problem: Knowledge Deficit  Goal: Patient/family/caregiver demonstrates understanding of disease process, treatment plan, medications, and discharge instructions  Description  Complete learning assessment and assess knowledge base    Interventions:  - Provide teaching at level of understanding  - Provide teaching via preferred learning methods  Outcome: Progressing

## 2019-07-01 NOTE — DISCHARGE INSTRUCTIONS
Dear Odalis Gray,     It was our pleasure to care for you here at [facility]   It is our hope that we were always able to meet and exceed the expected standards for your care during your stay  You were hospitalized due to pancreatitis  You were cared for on the 2nd floor under the service of Juarez López,  with the [facility] who covers for your primary care physician (PCP), Chris Kelly MD, while you were hospitalized  If you have any questions or concerns related to this hospitalization, you may contact us at [phone]  For follow up, we recommend that you follow up with your primary care physician  Please review this entire discharge summary as additional general instructions may be provided later as well  However, at this time we provide for you here, the most important instructions / recommendations at discharge:     · Follow a low fat diet  See below for additional recommendations for diet as well as information on pancreatitis as well  · Keep well hydrated by drinking plenty of fluid  · Stop taking the [place] and also discontinue the Vismodegib started by your dermatologist   We are recommending avoidance of Terris Filler, and that entire class of diabetes medications which are adjunctive only to your main diabetes medications  In terms of treatment for the basal cell carcinoma, please talk to your dermatologist about an alternative treatment to this medication  · Avoid alcohol use, especially over the next 1 month  Sincerely,     Juarez óLpez, DO     Pancreatitis   WHAT YOU NEED TO KNOW:   Pancreatitis is inflammation of your pancreas  The pancreas is an organ that makes insulin  It also makes enzymes (digestive juices) that help your body digest food  Pancreatitis may be an acute (short-term) problem that happens only once  It may become a chronic (long-term) problem that comes and goes over time         DISCHARGE INSTRUCTIONS:   Seek care immediately if:   · You have severe pain in your abdomen and you are vomiting  Contact your healthcare provider if:   · You have a fever  · You continue to lose weight without trying  · Your skin or the whites of your eyes turn yellow  · You have questions or concerns about your condition or care  Medicines: You may need any of the following:  · Antibiotics  treat a bacterial infection  · Prescription pain medicine  may be given  Ask your healthcare provider how to take this medicine safely  Some prescription pain medicines contain acetaminophen  Do not take other medicines that contain acetaminophen without talking to your healthcare provider  Too much acetaminophen may cause liver damage  Prescription pain medicine may cause constipation  Ask your healthcare provider how to prevent or treat constipation  · Take your medicine as directed  Contact your healthcare provider if you think your medicine is not helping or if you have side effects  Tell him or her if you are allergic to any medicine  Keep a list of the medicines, vitamins, and herbs you take  Include the amounts, and when and why you take them  Bring the list or the pill bottles to follow-up visits  Carry your medicine list with you in case of an emergency  Self-care:   · Rest  when you feel it is needed  Slowly start to do more each day  Return to your usual activities as directed  · Do not drink any alcohol  If you need help to stop drinking, contact the following organization:   ¨ Alcoholics Anonymous  Web Address: http://www [employer]/      · Ask your healthcare provider or dietitian about the best foods to eat  You may need to eat foods that are low in fat if you have chronic pancreatitis  Follow up with your healthcare provider as directed:  Write down your questions so you remember to ask them during your visits     © 2017 [employer] Information is for End User's use only and may not be sold, redistributed or otherwise used for commercial purposes  All illustrations and images included in CareNotes® are the copyrighted property of [employer]  or Natanael Whitney  The above information is an  only  It is not intended as medical advice for individual conditions or treatments  Talk to your doctor, nurse or pharmacist before following any medical regimen to see if it is safe and effective for you  Low Fat Diet   WHAT YOU NEED TO KNOW:   A low-fat diet is an eating plan that is low in total fat, unhealthy fat, and cholesterol  You may need to follow a low-fat diet if you have trouble digesting or absorbing fat  You may also need to follow this diet if you have high cholesterol  You can also lower your cholesterol by increasing the amount of fiber in your diet  Soluble fiber is a type of fiber that helps to decrease cholesterol levels  DISCHARGE INSTRUCTIONS:   Different types of fat in food:   · Limit unhealthy fats  A diet that is high in cholesterol, saturated fat, and trans fat may cause unhealthy cholesterol levels  Unhealthy cholesterol levels increase your risk of heart disease  ¨ Cholesterol:  Limit intake of cholesterol to less than 200 mg per day  Cholesterol is found in meat, eggs, and dairy  ¨ Saturated fat:  Limit saturated fat to less than 7% of your total daily calories  Ask your dietitian how many calories you need each day  Saturated fat is found in butter, cheese, ice cream, whole milk, and palm oil  Saturated fat is also found in meat, such as beef, pork, chicken skin, and processed meats  Processed meats include sausage, hot dogs, and bologna  ¨ Trans fat:  Avoid trans fat as much as possible  Trans fat is used in fried and baked foods  Foods that say trans fat free on the label may still have up to 0 5 grams of trans fat per serving  · Include healthy fats    Replace foods that are high in saturated and trans fat with foods high in healthy fats  This may help to decrease high cholesterol levels  ¨ Monounsaturated fats: These are found in avocados, nuts, and vegetable oils, such as olive, canola, and sunflower oil  ¨ Polyunsaturated fats: These can be found in vegetable oils, such as soybean or corn oil  Omega-3 fats can help to decrease the risk of heart disease  Omega-3 fats are found in fish, such as salmon, herring, trout, and tuna  Omega-3 fats can also be found in plant foods, such as walnuts, flaxseed, soybeans, and canola oil    Foods to limit or avoid:   · Grains:      ¨ Snacks that are made with partially hydrogenated oils, such as chips, regular crackers, and butter-flavored popcorn    ¨ High-fat baked goods, such as biscuits, croissants, doughnuts, pies, cookies, and pastries    · Dairy:      ¨ Whole milk, 2% milk, and yogurt and ice cream made with whole milk    ¨ Half and half creamer, heavy cream, and whipping cream    ¨ Cheese, cream cheese, and sour cream    · Meats and proteins:      ¨ High-fat cuts of meat (T-bone steak, regular hamburger, and ribs)    ¨ Fried meat, poultry (turkey and chicken), and fish    ¨ Poultry (chicken and turkey) with skin    ¨ Cold cuts (salami or bologna), hot dogs, bartlett, and sausage    ¨ Whole eggs and egg yolks    · Vegetables and fruits with added fat:      ¨ Fried vegetables or vegetables in butter or high-fat sauces, such as cream or cheese sauces    ¨ Fried fruit or fruit served with butter or cream    · Fats:      ¨ Butter, stick margarine, and shortening    ¨ Coconut, palm oil, and palm kernel oil  Foods to include:   · Grains:      ¨ Whole-grain breads, cereals, pasta, and brown rice    ¨ Low-fat crackers and pretzels    · Vegetables and fruits:      ¨ Fresh, frozen, or canned vegetables (no salt or low-sodium)    ¨ Fresh, frozen, dried, or canned fruit (canned in light syrup or fruit juice)    ¨ Avocado    · Low-fat dairy products:      ¨ Nonfat (skim) or 1% milk    ¨ Nonfat or low-fat cheese, yogurt, and cottage cheese    · Meats and proteins:      ¨ Chicken or turkey with no skin    ¨ Baked or broiled fish    ¨ Lean beef and pork (loin, round, extra lean hamburger)    ¨ Beans and peas, unsalted nuts, soy products    ¨ Egg whites and substitutes    ¨ Seeds and nuts    · Fats:      ¨ Unsaturated oil, such as canola, olive, peanut, soybean, or sunflower oil    ¨ Soft or liquid margarine and vegetable oil spread    ¨ Low-fat salad dressing  Other ways to decrease fat:   · Read food labels before you buy foods  Choose foods that have less than 30% of calories from fat  Choose low-fat or fat-free dairy products  Remember that fat free does not mean calorie free  These foods still contain calories, and too many calories can lead to weight gain  · Trim fat from meat and avoid fried food  Trim all visible fat from meat before you cook it  Remove the skin from poultry  Do not macias meat, fish, or poultry  Bake, roast, boil, or broil these foods instead  Avoid fried foods  Eat a baked potato instead of [place] fries  Steam vegetables instead of sautéing them in butter  · Add less fat to foods  Use imitation bartlett bits on salads and baked potatoes instead of regular bartlett bits  Use fat-free or low-fat salad dressings instead of regular dressings  Use low-fat or nonfat butter-flavored topping instead of regular butter or margarine on popcorn and other foods  Ways to decrease fat in recipes:  Replace high-fat ingredients with low-fat or nonfat ones  This may cause baked goods to be drier than usual  You may need to use nonfat cooking spray on pans to prevent food from sticking  You also may need to change the amount of other ingredients, such as water, in the recipe  Try the following:  · Use low-fat or light margarine instead of regular margarine or shortening  · Use lean ground turkey breast or chicken, or lean ground beef (less than 5% fat) instead of hamburger       · Add 1 teaspoon of canola oil to 8 ounces of skim milk instead of using cream or half and half  · Use grated zucchini, carrots, or apples in breads instead of coconut  · Use blenderized, low-fat cottage cheese, plain tofu, or low-fat ricotta cheese instead of cream cheese  · Use 1 egg white and 1 teaspoon of canola oil, or use ¼ cup (2 ounces) of fat-free egg substitute instead of a whole egg  · Replace half of the oil that is called for in a recipe with applesauce when you bake  Use 3 tablespoons of cocoa powder and 1 tablespoon of canola oil instead of a square of baking chocolate  How to increase fiber:  Eat enough high-fiber foods to get 20 to 30 grams of fiber every day  Slowly increase your fiber intake to avoid stomach cramps, gas, and other problems  · Eat 3 ounces of whole-grain foods each day  An ounce is about 1 slice of bread  Eat whole-grain breads, such as whole-wheat bread  Whole wheat, whole-wheat flour, or other whole grains should be listed as the first ingredient on the food label  Replace white flour with whole-grain flour or use half of each in recipes  Whole-grain flour is heavier than white flour, so you may have to add more yeast or baking powder  · Eat a high-fiber cereal for breakfast   Oatmeal is a good source of soluble fiber  Look for cereals that have bran or fiber in the name  Choose whole-grain products, such as brown rice, barley, and whole-wheat pasta  · Eat more beans, peas, and lentils  For example, add beans to soups or salads  Eat at least 5 cups of fruits and vegetables each day  Eat fruits and vegetables with the peel because the peel is high in fiber  © 2017 [employer] Information is for End User's use only and may not be sold, redistributed or otherwise used for commercial purposes  All illustrations and images included in CareNotes® are the copyrighted property of [employer]  or Natanael Whitney    The above information is an educational aid only  It is not intended as medical advice for individual conditions or treatments  Talk to your doctor, nurse or pharmacist before following any medical regimen to see if it is safe and effective for you

## 2019-07-01 NOTE — SOCIAL WORK
No needs are identified at the time of the initial assessment,  care manager will respond upon request or reassess patient for discharge needs as appropriate

## 2019-07-01 NOTE — PROGRESS NOTES
Progress Note - Jaydon Jeff 52 y o  male MRN: [id number]  Unit/Bed#: -01 Encounter: [id number]    Assessment and Plan:   Principal Problem:    Pancreatitis  Active Problems:    Basal cell carcinoma of scalp    Essential hypertension    HLD (hyperlipidemia)    Type 2 diabetes mellitus with hyperglycemia, with long-term current use of insulin (HCC)    Acute pancreatitis  -etiology unclear, he did state the weekend before the pain started he had about 3 alcoholic drinks, no gallstones, he is on Freya Maxi (has been on for 5 years) and was recently started on vismodegib for basal cell carcinoma which seemed to coincide with symptom onset though no known pancreatitis cases with this medications during clinical trials  -he has improved significantly  -continue IV fluids  -advance to low fat diet  -given known association of farxiga with pancreatitis would continue to hold and likely discontinue when he follows up with his endocrinologist  No studies associated with pancreatitis with vismodegib, would recommend following up with his dermatologist prior to resuming this (he does not he has noted significant improvement in his basal call lesions since starting this)    ----------------------------------------------------------------------------------------------------------------    Subjective:     Harika Edmond feels well  He denies any further abdominal pain  He feels mildly bloated  He is tolerating a clear liquid diet and is hungry    Objective:     Vitals: Blood pressure 159/88, pulse 73, temperature 98 3 °F (36 8 °C), temperature source Oral, resp  rate 18, weight 135 kg (298 lb 4 5 oz), SpO2 95 %  ,There is no height or weight on file to calculate BMI        Intake/Output Summary (Last 24 hours) at 7/1/2019 1121  Last data filed at 7/1/2019 1030  Gross per 24 hour   Intake --   Output 2425 ml   Net -2425 ml       Physical Exam:   General Appearance: Alert, appears stated age and cooperative  Lungs: Clear to auscultation bilaterally  Heart: Regular rate and rhythm  Abdomen: Soft, non-tender, non-distended; bowel sounds normal  Extremities: No edema    Invasive Devices     Peripheral Intravenous Line            Peripheral IV 06/30/19 Left Antecubital 1 day                Lab Results:  Results from last 7 days   Lab Units 06/30/19  0653 06/30/19  0124   WBC Thousand/uL 7 64 7 19   HEMOGLOBIN g/dL 14 9 15 7   HEMATOCRIT % 44 1 45 4   PLATELETS Thousands/uL 149 164   NEUTROS PCT %  --  62   LYMPHS PCT %  --  23   MONOS PCT %  --  10   EOS PCT %  --  3     Results from last 7 days   Lab Units 06/30/19  0603 06/30/19  0124   POTASSIUM mmol/L 3 8 3 5   CHLORIDE mmol/L 100 97*   CO2 mmol/L 26 27   BUN mg/dL 13 15   CREATININE mg/dL 0 80 0 94   CALCIUM mg/dL 8 7 9 4   ALK PHOS U/L  --  66   ALT U/L  --  52   AST U/L  --  25         Results from last 7 days   Lab Units 07/01/19  0501   LIPASE u/L 297       Imaging Studies: I have personally reviewed pertinent imaging studies  Us Right Upper Quadrant    Result Date: 6/30/2019  Impression: Limited by body habitus and bowel gas  1   Normal gallbladder  2   Enlarged echogenic liver possibly representing hepatic steatosis  Workstation performed: [id number]     Ct Abdomen Pelvis With Contrast    Result Date: 6/30/2019  Impression: Findings suggesting mild pancreatitis, as described above  Please see discussion  Clinical and laboratory correlation is suggested  There is no evidence of pancreatic pseudocyst at the present time   Workstation performed: [id number]

## 2019-07-02 NOTE — NURSING NOTE
Pt discharged and ambulated out with wife  Discharge instructions given and reviewed with pt and wife  Script given to pt  Pt had no questions

## 2021-03-10 DIAGNOSIS — Z23 ENCOUNTER FOR IMMUNIZATION: ICD-10-CM

## 2021-07-25 ENCOUNTER — HOSPITAL ENCOUNTER (EMERGENCY)
Facility: HOSPITAL | Age: 56
Discharge: ANOTHER HOSPITAL | End: 2021-07-26
Attending: EMERGENCY MEDICINE | Admitting: EMERGENCY MEDICINE
Payer: COMMERCIAL

## 2021-07-25 ENCOUNTER — APPOINTMENT (EMERGENCY)
Dept: RADIOLOGY | Facility: HOSPITAL | Age: 56
End: 2021-07-25
Payer: COMMERCIAL

## 2021-07-25 DIAGNOSIS — T81.49XA SURGICAL SITE INFECTION: Primary | ICD-10-CM

## 2021-07-25 LAB
ALBUMIN SERPL BCP-MCNC: 3.3 G/DL (ref 3.5–5)
ALP SERPL-CCNC: 66 U/L (ref 46–116)
ALT SERPL W P-5'-P-CCNC: 25 U/L (ref 12–78)
ANION GAP SERPL CALCULATED.3IONS-SCNC: 10 MMOL/L (ref 4–13)
APTT PPP: 35 SECONDS (ref 23–37)
AST SERPL W P-5'-P-CCNC: 16 U/L (ref 5–45)
BACTERIA UR QL AUTO: ABNORMAL /HPF
BASOPHILS # BLD AUTO: 0.08 THOUSANDS/ΜL (ref 0–0.1)
BASOPHILS NFR BLD AUTO: 1 % (ref 0–1)
BILIRUB SERPL-MCNC: 1.88 MG/DL (ref 0.2–1)
BILIRUB UR QL STRIP: NEGATIVE
BUN SERPL-MCNC: 17 MG/DL (ref 5–25)
CALCIUM ALBUM COR SERPL-MCNC: 9.9 MG/DL (ref 8.3–10.1)
CALCIUM SERPL-MCNC: 9.3 MG/DL (ref 8.3–10.1)
CHLORIDE SERPL-SCNC: 97 MMOL/L (ref 100–108)
CLARITY UR: CLEAR
CO2 SERPL-SCNC: 29 MMOL/L (ref 21–32)
COLOR UR: YELLOW
CREAT SERPL-MCNC: 0.94 MG/DL (ref 0.6–1.3)
EOSINOPHIL # BLD AUTO: 0.27 THOUSAND/ΜL (ref 0–0.61)
EOSINOPHIL NFR BLD AUTO: 2 % (ref 0–6)
ERYTHROCYTE [DISTWIDTH] IN BLOOD BY AUTOMATED COUNT: 13.2 % (ref 11.6–15.1)
GFR SERPL CREATININE-BSD FRML MDRD: 89 ML/MIN/1.73SQ M
GLUCOSE SERPL-MCNC: 200 MG/DL (ref 65–140)
GLUCOSE UR STRIP-MCNC: ABNORMAL MG/DL
HCT VFR BLD AUTO: 47.1 % (ref 36.5–49.3)
HGB BLD-MCNC: 15.4 G/DL (ref 12–17)
HGB UR QL STRIP.AUTO: NEGATIVE
IMM GRANULOCYTES # BLD AUTO: 0.2 THOUSAND/UL (ref 0–0.2)
IMM GRANULOCYTES NFR BLD AUTO: 1 % (ref 0–2)
INR PPP: 1.04 (ref 0.84–1.19)
KETONES UR STRIP-MCNC: NEGATIVE MG/DL
LACTATE SERPL-SCNC: 1.9 MMOL/L (ref 0.5–2)
LEUKOCYTE ESTERASE UR QL STRIP: NEGATIVE
LYMPHOCYTES # BLD AUTO: 1.31 THOUSANDS/ΜL (ref 0.6–4.47)
LYMPHOCYTES NFR BLD AUTO: 9 % (ref 14–44)
MCH RBC QN AUTO: 29.5 PG (ref 26.8–34.3)
MCHC RBC AUTO-ENTMCNC: 32.7 G/DL (ref 31.4–37.4)
MCV RBC AUTO: 90 FL (ref 82–98)
MONOCYTES # BLD AUTO: 1.38 THOUSAND/ΜL (ref 0.17–1.22)
MONOCYTES NFR BLD AUTO: 9 % (ref 4–12)
MUCOUS THREADS UR QL AUTO: ABNORMAL
NEUTROPHILS # BLD AUTO: 11.5 THOUSANDS/ΜL (ref 1.85–7.62)
NEUTS SEG NFR BLD AUTO: 78 % (ref 43–75)
NITRITE UR QL STRIP: NEGATIVE
NON-SQ EPI CELLS URNS QL MICRO: ABNORMAL /HPF
NRBC BLD AUTO-RTO: 0 /100 WBCS
PH UR STRIP.AUTO: 6 [PH] (ref 4.5–8)
PLATELET # BLD AUTO: 157 THOUSANDS/UL (ref 149–390)
PMV BLD AUTO: 11 FL (ref 8.9–12.7)
POTASSIUM SERPL-SCNC: 4.2 MMOL/L (ref 3.5–5.3)
PROT SERPL-MCNC: 8.2 G/DL (ref 6.4–8.2)
PROT UR STRIP-MCNC: ABNORMAL MG/DL
PROTHROMBIN TIME: 13.7 SECONDS (ref 11.6–14.5)
RBC # BLD AUTO: 5.22 MILLION/UL (ref 3.88–5.62)
RBC #/AREA URNS AUTO: ABNORMAL /HPF
SARS-COV-2 RNA RESP QL NAA+PROBE: NEGATIVE
SODIUM SERPL-SCNC: 136 MMOL/L (ref 136–145)
SP GR UR STRIP.AUTO: >=1.03 (ref 1–1.03)
UROBILINOGEN UR QL STRIP.AUTO: 0.2 E.U./DL
WBC # BLD AUTO: 14.74 THOUSAND/UL (ref 4.31–10.16)
WBC #/AREA URNS AUTO: ABNORMAL /HPF

## 2021-07-25 PROCEDURE — 96367 TX/PROPH/DG ADDL SEQ IV INF: CPT

## 2021-07-25 PROCEDURE — 87070 CULTURE OTHR SPECIMN AEROBIC: CPT

## 2021-07-25 PROCEDURE — 83605 ASSAY OF LACTIC ACID: CPT

## 2021-07-25 PROCEDURE — 87205 SMEAR GRAM STAIN: CPT

## 2021-07-25 PROCEDURE — 85610 PROTHROMBIN TIME: CPT

## 2021-07-25 PROCEDURE — 36415 COLL VENOUS BLD VENIPUNCTURE: CPT

## 2021-07-25 PROCEDURE — 99285 EMERGENCY DEPT VISIT HI MDM: CPT | Performed by: EMERGENCY MEDICINE

## 2021-07-25 PROCEDURE — 87186 SC STD MICRODIL/AGAR DIL: CPT

## 2021-07-25 PROCEDURE — 85730 THROMBOPLASTIN TIME PARTIAL: CPT

## 2021-07-25 PROCEDURE — 85025 COMPLETE CBC W/AUTO DIFF WBC: CPT

## 2021-07-25 PROCEDURE — 96366 THER/PROPH/DIAG IV INF ADDON: CPT

## 2021-07-25 PROCEDURE — 86618 LYME DISEASE ANTIBODY: CPT

## 2021-07-25 PROCEDURE — 96375 TX/PRO/DX INJ NEW DRUG ADDON: CPT

## 2021-07-25 PROCEDURE — 96361 HYDRATE IV INFUSION ADD-ON: CPT

## 2021-07-25 PROCEDURE — U0003 INFECTIOUS AGENT DETECTION BY NUCLEIC ACID (DNA OR RNA); SEVERE ACUTE RESPIRATORY SYNDROME CORONAVIRUS 2 (SARS-COV-2) (CORONAVIRUS DISEASE [COVID-19]), AMPLIFIED PROBE TECHNIQUE, MAKING USE OF HIGH THROUGHPUT TECHNOLOGIES AS DESCRIBED BY CMS-2020-01-R: HCPCS | Performed by: EMERGENCY MEDICINE

## 2021-07-25 PROCEDURE — U0005 INFEC AGEN DETEC AMPLI PROBE: HCPCS | Performed by: EMERGENCY MEDICINE

## 2021-07-25 PROCEDURE — 87077 CULTURE AEROBIC IDENTIFY: CPT

## 2021-07-25 PROCEDURE — 84145 PROCALCITONIN (PCT): CPT

## 2021-07-25 PROCEDURE — 96365 THER/PROPH/DIAG IV INF INIT: CPT

## 2021-07-25 PROCEDURE — 81001 URINALYSIS AUTO W/SCOPE: CPT

## 2021-07-25 PROCEDURE — 80053 COMPREHEN METABOLIC PANEL: CPT

## 2021-07-25 PROCEDURE — 87040 BLOOD CULTURE FOR BACTERIA: CPT

## 2021-07-25 PROCEDURE — 99285 EMERGENCY DEPT VISIT HI MDM: CPT

## 2021-07-25 PROCEDURE — 73564 X-RAY EXAM KNEE 4 OR MORE: CPT

## 2021-07-25 PROCEDURE — 96376 TX/PRO/DX INJ SAME DRUG ADON: CPT

## 2021-07-25 RX ORDER — ONDANSETRON 2 MG/ML
4 INJECTION INTRAMUSCULAR; INTRAVENOUS ONCE
Status: COMPLETED | OUTPATIENT
Start: 2021-07-25 | End: 2021-07-25

## 2021-07-25 RX ORDER — ONDANSETRON 2 MG/ML
4 INJECTION INTRAMUSCULAR; INTRAVENOUS ONCE
Status: COMPLETED | OUTPATIENT
Start: 2021-07-26 | End: 2021-07-26

## 2021-07-25 RX ORDER — MORPHINE SULFATE 4 MG/ML
4 INJECTION, SOLUTION INTRAMUSCULAR; INTRAVENOUS ONCE
Status: COMPLETED | OUTPATIENT
Start: 2021-07-25 | End: 2021-07-25

## 2021-07-25 RX ORDER — HYDROMORPHONE HCL/PF 1 MG/ML
0.5 SYRINGE (ML) INJECTION ONCE
Status: COMPLETED | OUTPATIENT
Start: 2021-07-25 | End: 2021-07-25

## 2021-07-25 RX ORDER — HYDROMORPHONE HCL/PF 1 MG/ML
0.5 SYRINGE (ML) INJECTION ONCE
Status: COMPLETED | OUTPATIENT
Start: 2021-07-26 | End: 2021-07-26

## 2021-07-25 RX ORDER — SODIUM CHLORIDE, SODIUM LACTATE, POTASSIUM CHLORIDE, CALCIUM CHLORIDE 600; 310; 30; 20 MG/100ML; MG/100ML; MG/100ML; MG/100ML
100 INJECTION, SOLUTION INTRAVENOUS CONTINUOUS
Status: DISCONTINUED | OUTPATIENT
Start: 2021-07-25 | End: 2021-07-26 | Stop reason: HOSPADM

## 2021-07-25 RX ORDER — ACETAMINOPHEN 325 MG/1
650 TABLET ORAL ONCE
Status: COMPLETED | OUTPATIENT
Start: 2021-07-25 | End: 2021-07-25

## 2021-07-25 RX ADMIN — ONDANSETRON 4 MG: 2 INJECTION INTRAMUSCULAR; INTRAVENOUS at 14:55

## 2021-07-25 RX ADMIN — SODIUM CHLORIDE, SODIUM LACTATE, POTASSIUM CHLORIDE, AND CALCIUM CHLORIDE 1000 ML: .6; .31; .03; .02 INJECTION, SOLUTION INTRAVENOUS at 16:00

## 2021-07-25 RX ADMIN — ACETAMINOPHEN 650 MG: 325 TABLET, FILM COATED ORAL at 16:45

## 2021-07-25 RX ADMIN — SODIUM CHLORIDE, SODIUM LACTATE, POTASSIUM CHLORIDE, AND CALCIUM CHLORIDE 100 ML/HR: .6; .31; .03; .02 INJECTION, SOLUTION INTRAVENOUS at 22:11

## 2021-07-25 RX ADMIN — MORPHINE SULFATE 4 MG: 4 INJECTION INTRAVENOUS at 14:56

## 2021-07-25 RX ADMIN — HYDROMORPHONE HYDROCHLORIDE 0.5 MG: 1 INJECTION, SOLUTION INTRAMUSCULAR; INTRAVENOUS; SUBCUTANEOUS at 19:38

## 2021-07-25 RX ADMIN — ONDANSETRON 4 MG: 2 INJECTION INTRAMUSCULAR; INTRAVENOUS at 19:38

## 2021-07-25 RX ADMIN — VANCOMYCIN HYDROCHLORIDE 2000 MG: 1 INJECTION, POWDER, LYOPHILIZED, FOR SOLUTION INTRAVENOUS at 18:18

## 2021-07-25 RX ADMIN — CEFEPIME HYDROCHLORIDE 2000 MG: 2 INJECTION, POWDER, FOR SOLUTION INTRAVENOUS at 17:39

## 2021-07-25 NOTE — ED PROVIDER NOTES
History  Chief Complaint   Patient presents with    Knee Injury     pt states had R knee surgery 6-23-21 had a knee immobilizer on was getting in car R leg twisted this past Blanca Keys is now having pain and cant walk      Mr Marva Feliciano is a 12-year-old male presenting with right knee pain and swelling x3 days  States he had right quadriceps tendon surgically repaired about 4 weeks ago, has been using need a mobilizer since  On Friday, 07/23/2021, he twisted his right leg while trying to get into a car  Notes that it did not immediately hurt, nor swell, however has become progressively more swollen and painful since then  States the pain feels intensely sore, is located over the surgical site, also on the medial and lateral sides of the right knee joint, is red and hot, does not radiate, is not relieved by motrin, tylenol, or heat, 12/10  Notes some drainage from incision  Also notes low grade fevers(Tmax 100 6 while taking gonzalez and tylenol) since yesterday accompanied by chills  Denies bleeding from incision site, swelling distal to the site, or numbness or tingling of the right leg  Is able to ambulate with intense pain with aid of a walker, however is not permitted ambulate without immobilizer as he is not cleared to flex or rotate knee 2/2 surgery  Last dose ibuprofen 0900 this morning, acetaminophen 1100 this morning  History of insulin dependent diabetes mellitus  Prior to Admission Medications   Prescriptions Last Dose Informant Patient Reported? Taking?    Insulin Pen Needle (B-D UF III MINI PEN NEEDLES) 31G X 5 MM MISC  Self Yes No   Sig: by Does not apply route   amLODIPine (NORVASC) 10 mg tablet  Self Yes No   Sig: Take by mouth   atenolol (TENORMIN) 50 mg tablet  Self Yes No   Sig: Take by mouth   insulin degludec (TRESIBA FLEXTOUCH) 100 units/mL injection pen   Yes No   Sig: Inject 50 Units under the skin daily at bedtime   insulin lispro (HUMALOG KWIKPEN) 100 Units/mL SOPN  Self Yes No   Sig: Inject 30 Units under the skin 2 (two) times a day with meals    metFORMIN (GLUCOPHAGE) 500 mg tablet   Yes No   Sig: Take 1,000 mg by mouth 2 (two) times a day with meals   pravastatin (PRAVACHOL) 20 mg tablet  Self Yes No   Sig: Take by mouth   quinapril-hydrochlorothiazide (ACCURETIC) 20-12 5 MG per tablet  Self Yes No   Sig: Take 1 tablet by mouth 2 (two) times a day       Facility-Administered Medications: None       Past Medical History:   Diagnosis Date    Essential hypertension 6/30/2019    HLD (hyperlipidemia) 6/30/2019    Pancreatitis 6/30/2019    Type 2 diabetes mellitus with hyperglycemia, with long-term current use [facility] 6/30/2019       History reviewed  No pertinent surgical history  History reviewed  No pertinent family history  I have reviewed and agree with the history as documented  E-Cigarette/Vaping     E-Cigarette/Vaping Substances     Social History     Tobacco Use    Smoking status: Never Smoker    Smokeless tobacco: Never Used   Substance Use Topics    Alcohol use: Yes     Comment: socially    Drug use: No        Review of Systems   Constitutional: Positive for chills and fever  Negative for activity change, appetite change, diaphoresis and fatigue  HENT: Negative  Negative for congestion, postnasal drip, rhinorrhea, sneezing and sore throat  Respiratory: Negative  Negative for cough and shortness of breath  Cardiovascular: Negative for chest pain and palpitations  No calf pain, swelling, or redness bilaterally  Gastrointestinal: Negative  Negative for abdominal pain, constipation, diarrhea, nausea and vomiting  Endocrine: Negative  Genitourinary: Negative  Negative for difficulty urinating, dysuria and hematuria  Musculoskeletal: Positive for arthralgias and joint swelling  Negative for back pain and myalgias  Right knee pain  Skin: Positive for color change  Negative for pallor and rash  Right knee reddened  Neurological: Negative  Negative for dizziness, syncope, weakness, light-headedness and headaches  Hematological: Negative  Does not bruise/bleed easily  Psychiatric/Behavioral: Negative  All other systems reviewed and are negative  Physical Exam  ED Triage Vitals   Temperature Pulse Respirations Blood Pressure SpO2   07/25/21 1231 07/25/21 1231 07/25/21 1231 07/25/21 1231 07/25/21 1231   100 °F (37 8 °C) 89 16 161/75 94 %      Temp Source Heart Rate Source Patient Position - Orthostatic VS BP Location FiO2 (%)   07/25/21 1231 07/25/21 1457 07/25/21 1457 07/25/21 1457 --   Oral Monitor Lying Right arm       Pain Score       07/25/21 1231       9             Orthostatic Vital Signs  Vitals:    07/25/21 1231 07/25/21 1457   BP: 161/75 (!) 185/85   Pulse: 89 79   Patient Position - Orthostatic VS:  Lying       Physical Exam  Vitals and nursing note reviewed  Constitutional:       General: He is not in acute distress  Appearance: Normal appearance  He is not ill-appearing  HENT:      Head: Normocephalic and atraumatic  Right Ear: External ear normal       Left Ear: External ear normal       Nose: Nose normal       Mouth/Throat:      Mouth: Mucous membranes are moist       Pharynx: Oropharynx is clear  Eyes:      Extraocular Movements: Extraocular movements intact  Cardiovascular:      Rate and Rhythm: Normal rate and regular rhythm  Pulses: Normal pulses  Dorsalis pedis pulses are 2+ on the right side and 2+ on the left side  Heart sounds: Normal heart sounds  Pulmonary:      Effort: Pulmonary effort is normal  No respiratory distress  Breath sounds: Normal breath sounds  No wheezing, rhonchi or rales  Abdominal:      General: Abdomen is flat  Palpations: Abdomen is soft  Tenderness: There is no abdominal tenderness  Musculoskeletal:      Cervical back: Normal range of motion  Right knee: Swelling and erythema present  No deformity   Decreased range of motion  Tenderness present over the medial joint line and lateral joint line  Left knee: Normal       Right lower leg: No edema  Left lower leg: No edema  Comments: Right knee is warm to touch  Midline joint incision intact, no drainage or bleeding noted  Right lower extremity neurovascularly intact  Unable to assess ROM or perform special tests 2/2 post operative knee complete immobilization instructions  Skin:     General: Skin is warm and dry  Capillary Refill: Capillary refill takes less than 2 seconds  Comments: No erythema or tenderness to palpation of the bilateral calves  Neurological:      General: No focal deficit present  Mental Status: He is alert     Psychiatric:         Mood and Affect: Mood normal          Behavior: Behavior normal          ED Medications  Medications   lactated ringers infusion (has no administration in time range)   ondansetron (ZOFRAN) injection 4 mg (has no administration in time range)   HYDROmorphone (DILAUDID) injection 0 5 mg (has no administration in time range)   morphine (PF) 4 mg/mL injection 4 mg (4 mg Intravenous Given 7/25/21 1456)   ondansetron (ZOFRAN) injection 4 mg (4 mg Intravenous Given 7/25/21 1455)   cefepime (MAXIPIME) 2,000 mg in dextrose 5 % 50 mL IVPB (0 mg Intravenous Stopped 7/25/21 1809)   vancomycin (VANCOCIN) 2,000 mg in sodium chloride 0 9 % 500 mL IVPB (2,000 mg Intravenous New Bag 7/25/21 1818)   lactated ringers bolus 1,000 mL (0 mL Intravenous Stopped 7/25/21 1740)   acetaminophen (TYLENOL) tablet 650 mg (650 mg Oral Given 7/25/21 1645)   HYDROmorphone (DILAUDID) injection 0 5 mg (0 5 mg Intravenous Given 7/25/21 1938)   ondansetron (ZOFRAN) injection 4 mg (4 mg Intravenous Given 7/25/21 1938)       Diagnostic Studies  Results Reviewed     Procedure Component Value Units Date/Time    Novel Coronavirus [facility] [[id number]]  (Normal) Collected: 07/25/21 1659    Lab Status: Final result Specimen: Nares from Nose Updated: 07/25/21 1847     SARS-CoV-2 Negative    Narrative: The specimen collection materials, transport medium, and/or testing methodology utilized in the production of these test results have been proven to be reliable in a limited validation with an abbreviated program under the Emergency Utilization Authorization provided by the FDA  Testing reported as "Presumptive positive" will be confirmed with secondary testing to ensure result accuracy  Clinical caution and judgement should be used with the interpretation of these results with consideration of the clinical impression and other laboratory testing  Testing reported as "Positive" or "Negative" has been proven to be accurate according to standard laboratory validation requirements  All testing is performed with control materials showing appropriate reactivity at standard intervals  Wound culture and Gram stain [[id number]] Collected: 07/25/21 1603    Lab Status: In process Specimen: Wound from Arm, Left Updated: 07/25/21 1639    Protime-INR [[id number]]  (Normal) Collected: 07/25/21 1603    Lab Status: Final result Specimen: Blood Updated: 07/25/21 1612     Protime 13 7 seconds      INR 1 04    APTT [[id number]]  (Normal) Collected: 07/25/21 1603    Lab Status: Final result Specimen: Blood Updated: 07/25/21 1612     PTT 35 seconds     Lactic acid, plasma [[id number]]  (Normal) Collected: 07/25/21 1442    Lab Status: Final result Specimen: Blood from Arm, Left Updated: 07/25/21 1523     LACTIC ACID 1 9 mmol/L     Narrative:      Result may be elevated if tourniquet was used during collection      Comprehensive metabolic panel [[id number]]  (Abnormal) Collected: 07/25/21 1442    Lab Status: Final result Specimen: Blood from Arm, Left Updated: 07/25/21 1520     Sodium 136 mmol/L      Potassium 4 2 mmol/L      Chloride 97 mmol/L      CO2 29 mmol/L      ANION GAP 10 mmol/L      BUN 17 mg/dL      Creatinine 0 94 mg/dL      Glucose 200 mg/dL Calcium 9 3 mg/dL      Corrected Calcium 9 9 mg/dL      AST 16 U/L      ALT 25 U/L      Alkaline Phosphatase 66 U/L      Total Protein 8 2 g/dL      Albumin 3 3 g/dL      Total Bilirubin 1 88 mg/dL      eGFR 89 ml/min/1 73sq m     Narrative:      [place] guidelines for Chronic Kidney Disease (CKD):     Stage 1 with normal or high GFR (GFR > 90 mL/min/1 73 square meters)    Stage 2 Mild CKD (GFR = 60-89 mL/min/1 73 square meters)    Stage 3A Moderate CKD (GFR = 45-59 mL/min/1 73 square meters)    Stage 3B Moderate CKD (GFR = 30-44 mL/min/1 73 square meters)    Stage 4 Severe CKD (GFR = 15-29 mL/min/1 73 square meters)    Stage 5 End Stage CKD (GFR <15 mL/min/1 73 square meters)  Note: GFR calculation is accurate only with a steady state creatinine    CBC and differential [[id number]]  (Abnormal) Collected: 07/25/21 1442    Lab Status: Final result Specimen: Blood from Arm, Left Updated: 07/25/21 1503     WBC 14 74 Thousand/uL      RBC 5 22 Million/uL      Hemoglobin 15 4 g/dL      Hematocrit 47 1 %      MCV 90 fL      MCH 29 5 pg      MCHC 32 7 g/dL      RDW 13 2 %      MPV 11 0 fL      Platelets 113 Thousands/uL      nRBC 0 /100 WBCs      Neutrophils Relative 78 %      Immat GRANS % 1 %      Lymphocytes Relative 9 %      Monocytes Relative 9 %      Eosinophils Relative 2 %      Basophils Relative 1 %      Neutrophils Absolute 11 50 Thousands/µL      Immature Grans Absolute 0 20 Thousand/uL      Lymphocytes Absolute 1 31 Thousands/µL      Monocytes Absolute 1 38 Thousand/µL      Eosinophils Absolute 0 27 Thousand/µL      Basophils Absolute 0 08 Thousands/µL     Lyme Antibody Profile with reflex to [facility] [[id number]] Collected: 07/25/21 1442    Lab Status: In process Specimen: Blood from Arm, Left Updated: 07/25/21 1458    Procalcitonin with AM Reflex [[id number]] Collected: 07/25/21 1442    Lab Status:  In process Specimen: Blood from Arm, Left Updated: 07/25/21 1458    Blood culture #1 [[id number]] Collected: 07/25/21 1447    Lab Status: In process Specimen: Blood from Arm, Right Updated: 07/25/21 1458    Blood culture #2 [[id number]] Collected: 07/25/21 1442    Lab Status: In process Specimen: Blood from Arm, Left Updated: 07/25/21 1458                 XR knee 4+ vw right injury   ED Interpretation by Sandie Dominguez DO (07/25 1921)   No acute bony abnormalities, no obvious joint effusion  Procedures  Procedures      ED Course  ED Course as of Jul 25 2038   Sun Jul 25, 2021   1504 WBC(!): 14 74   1851 PACS aware  SARS-COV-2: Negative   2000 Pain 3/10 after dilaudid, pt resting comfortably  Initial Sepsis Screening     Row Name 07/25/21 1615 07/25/21 1544             Is the patient's history suggestive of a new or worsening infection? (!) Yes (Proceed)  -AW  --       Suspected source of infection  infected joint;wound infection  -AW  infected joint  -AW       Are two or more of the following signs & symptoms of infection both present and new to the patient? (!) Yes (Proceed)  -AW  (!) Yes (Proceed)  -AW       Indicate SIRS criteria  Hyperthemia > 38 3C (100 9F); Leukocytosis (WBC > 13581 IJL)  -AW  Hyperthemia > 38 3C (100 9F); Leukocytosis (WBC > 28516 IJL)  -AW       If the answer is yes to both questions, suspicion of sepsis is present  --  --       If severe sepsis is present AND tissue hypoperfusion perists in the hour after fluid resuscitation or lactate > 4, the patient meets criteria for SEPTIC SHOCK  --  --       Are any of the following organ dysfunction criteria present within 6 hours of suspected infection and SIRS criteria that are NOT considered to be chronic conditions?   No  -AW  --       Organ dysfunction  --  --       Date of presentation of severe sepsis  --  --       Time of presentation of severe sepsis  --  --       Tissue hypoperfusion persists in the hour after crystalloid fluid administration, evidenced, by either:  --  -- Was hypotension present within one hour of the conclusion of crystalloid fluid administration?  --  --       Date of presentation of septic shock  --  --       Time of presentation of septic shock  --  --         User Key  (r) = Recorded By, (t) = Taken By, (c) = Cosigned By    [address] Name Provider Type    BRITTNI Hylton DO Resident                    MDM  Number of Diagnoses or Management Options  Diagnosis management comments: Mr Juan Antonio Gutiérrez is a 60 yom presenting with acute right knee pain, swelling, and erythema x 3 days, with fever and chills since yesterday morning(7/24/2021)  Hx of quadriceps tendon repair 6/23/2021  ROS: reveals twisting injury Friday night(7/23/2021), however did not start to swell or become painful immediately  +fever, chills, redness, scant purulent incision drainage  Physical exam: Febrile  Right knee is warm, tender to palpation, erythematous, without obvious effusion  R lower extremity neurovascularly intact,  No evidence of DVT  Labs: WBC 14 7, CMP, lactic, and coags unremarkable  COVID negative  Given pt is postoperative and high suspicion of infection, started empirically on vanc and cefipime  [facility] ortho, Dr Luciana Moura, suggested reaching out to [facility], as they are the surgical team who initially operated  Dr Alex Stanton, [facility] internal medicine/hospitalist, accepted patient to med/surg, will consult orthopedics to evaluate pt after transfer  Pt stable throughout ED course  Pain well controlled with dilaudid  Zofran, dilaudid orders placed for 0015 on 7/26/2021, which is approximately 15-30 minutes prior to transport         Amount and/or Complexity of Data Reviewed  Clinical lab tests: ordered and reviewed  Tests in the radiology section of CPT®: ordered and reviewed  Tests in the medicine section of CPT®: reviewed and ordered  Decide to obtain previous medical records or to obtain history from someone other than the patient: yes  Obtain history from someone other than the patient: yes  Review and summarize past medical records: yes  Discuss the patient with other providers: yes  Independent visualization of images, tracings, or specimens: yes        Disposition  Final diagnoses:   None     ED Disposition     ED Disposition Condition Date/Time Comment    Transfer to Another [address] [facility] Jul 25, 2021  5:02 PM Fausto Russo should be transferred out to [facility] Arleth Vee MD Documentation      Most Recent Value   Patient Condition  The patient has been stabilized such that within reasonable medical probability, no material deterioration of the patient condition or the condition of the unborn child(erin) is likely to result from the transfer   Reason for Transfer  Level of Care needed not available at this facility   Benefits of Transfer  Specialized equipment and/or services available at the receiving facility (Include comment)________________________ Genesis Ochoa w/ his surgical team]   Risks of Transfer  Other: (Include comment)__________________________ [transportation/ motor vehicle collision]   Accepting Physician  Dr Cyndy Epps Name, Kalee Andrey, [place]   Sending MD  Drs  [facility]   Provider Certification  General risk, such as traffic hazards, adverse weather conditions, rough terrain or turbulence, possible failure of equipment (including vehicle or aircraft), or consequences of actions of persons outside the control of the transport personnel      RN Documentation      Most [facility] Name, Jaida Yeh [address]    None         Patient's Medications   Discharge Prescriptions    No medications on file     No discharge procedures on file  PDMP Review     None           ED Provider  Attending physically available and evaluated Fausto Russo   I managed the patient along with the ED Attending      Electronically Signed by         Guillermo Mckeon DO  07/25/21 3239

## 2021-07-25 NOTE — EMTALA/ACUTE CARE TRANSFER
[address] [place] [address]  Dept: [phone]      EMTALA TRANSFER CONSENT    NAME Peyton Ramos                                         1965                              MRN [id number]    I have been informed of my rights regarding examination, treatment, and transfer   by Dr Sanchez Scale: Specialized equipment and/or services available at the receiving facility (Include comment)________________________ Mary Colmenares w/ his surgical team)    Risks: Other: (Include comment)__________________________ (transportation/ motor vehicle collision)      Consent for Transfer:  I acknowledge that my medical condition has been evaluated and explained to me by the emergency department physician or other qualified medical person and/or my attending physician, who has recommended that I be transferred to the service of  Accepting Physician: Dr Bridger Su at [address], [address] : [place], [place]  The above potential benefits of such transfer, the potential risks associated with such transfer, and the probable risks of not being transferred have been explained to me, and I fully understand them  The doctor has explained that, in my case, the benefits of transfer outweigh the risks  I agree to be transferred  I authorize the performance of emergency medical procedures and treatments upon me in both transit and upon arrival at the receiving facility  Additionally, I authorize the release of any and all medical records to the receiving facility and request they be transported with me, if possible  I understand that the safest mode of transportation during a medical emergency is an ambulance and that the Hospital advocates the use of this mode of transport   Risks of traveling to the receiving facility by car, including absence of medical control, life sustaining equipment, such as oxygen, and medical personnel has been explained to me and I fully understand them  (FRANC CORRECT BOX BELOW)  [  ]  I consent to the stated transfer and to be transported by ambulance/helicopter  [  ]  I consent to the stated transfer, but refuse transportation by ambulance and accept full responsibility for my transportation by car  I understand the risks of non-ambulance transfers and I exonerate the Hospital and its staff from any deterioration in my condition that results from this refusal     X___________________________________________    DATE  21  TIME________  Signature of patient or legally responsible individual signing on patient behalf           RELATIONSHIP TO PATIENT_________________________          Provider Certification    NAME Rebekah Peng                                         1965                              MRN [id number]    A medical screening exam was performed on the above named patient  Based on the examination:    Condition Necessitating Transfer There were no encounter diagnoses      Patient Condition: The patient has been stabilized such that within reasonable medical probability, no material deterioration of the patient condition or the condition of the unborn child(erin) is likely to result from the transfer    Reason for Transfer: Level of Care needed not available at this facility    Transfer Requirements: [address] [place], [place]   · Space available and qualified personnel available for treatment as acknowledged by    · Agreed to accept transfer and to provide appropriate medical treatment as acknowledged by       Dr Car Cantu  · Appropriate medical records of the examination and treatment of the patient are provided at the time of transfer   [address], [address] _______  · Transfer will be performed by qualified personnel from    and appropriate transfer equipment as required, including the use of necessary and appropriate life support measures  Provider Certification: I have examined the patient and explained the following risks and benefits of being transferred/refusing transfer to the patient/family:  General risk, such as traffic hazards, adverse weather conditions, rough terrain or turbulence, possible failure of equipment (including vehicle or aircraft), or consequences of actions of persons outside the control of the transport personnel      Based on these reasonable risks and benefits to the patient and/or the unborn child(erin), and based upon the information available at the time of the patients examination, I certify that the medical benefits reasonably to be expected from the provision of appropriate medical treatments at another medical facility outweigh the increasing risks, if any, to the individuals medical condition, and in the case of labor to the unborn child, from effecting the transfer      X____________________________________________ DATE 07/25/21        TIME_______      ORIGINAL - SEND TO MEDICAL RECORDS   COPY - SEND WITH PATIENT DURING TRANSFER

## 2021-07-26 VITALS
OXYGEN SATURATION: 91 % | HEART RATE: 104 BPM | HEIGHT: 71 IN | RESPIRATION RATE: 18 BRPM | WEIGHT: 275.57 LBS | DIASTOLIC BLOOD PRESSURE: 72 MMHG | TEMPERATURE: 98.9 F | SYSTOLIC BLOOD PRESSURE: 158 MMHG | BODY MASS INDEX: 38.58 KG/M2

## 2021-07-26 LAB — PROCALCITONIN SERPL-MCNC: 0.11 NG/ML

## 2021-07-26 PROCEDURE — 96361 HYDRATE IV INFUSION ADD-ON: CPT

## 2021-07-26 PROCEDURE — 96376 TX/PRO/DX INJ SAME DRUG ADON: CPT

## 2021-07-26 RX ADMIN — HYDROMORPHONE HYDROCHLORIDE 0.5 MG: 1 INJECTION, SOLUTION INTRAMUSCULAR; INTRAVENOUS; SUBCUTANEOUS at 00:18

## 2021-07-26 RX ADMIN — ONDANSETRON 4 MG: 2 INJECTION INTRAMUSCULAR; INTRAVENOUS at 00:18

## 2021-07-26 NOTE — ED NOTES
EMS HERE TO TRANSPORT PATIENT, FLUIDS DISCONTINUED, LINE INTACT AND CLAMPED      Love Reason, RN  07/26/21 9517

## 2021-07-26 NOTE — ED NOTES
Transfer Information:    Ambulance Squad: Padmini Rainey Time: 0030   Destination: [facility]   Accepting Physician: Dr Alhaji Hartmann   Report Number: [phone]          Marina Cerda RN  07/25/21 2038

## 2021-07-26 NOTE — ED ATTENDING ATTESTATION
7/25/2021  IAlka MD, saw and evaluated the patient  I have discussed the patient with the resident/non-physician practitioner and agree with the resident's/non-physician practitioner's findings, Plan of Care, and MDM as documented in the resident's/non-physician practitioner's note, except where noted  All available labs and Radiology studies were reviewed  I was present for key portions of any procedure(s) performed by the resident/non-physician practitioner and I was immediately available to provide assistance  At this point I agree with the current assessment done in the Emergency Department  I have conducted an independent evaluation of this patient a history and physical is as follows:    59 yo M presents to the ED for evaluation w/ R knee pain, swelling & fever  He underwent surgery at [facility] on 6/23 for R quadriceps tendon repair & reported doing well afterwards - weight bearing as allowed w/ use of brace holding leg in extension  Over the past few weeks the brace has been slipping down & he explains that 2 d ago he intended to pull leg into vehicle & the adjust brace upwards  Foot caught on vehicle entry - twisting it out to the side  He had some pain though did not experience a pop sensation  Upon waking the next day swelling was increased  He now has pain of much greater intensity & inability to put any weight on the foot  He endorses intense chills & relays he has been checking his temperature hourly over the past couple of days w/ Tmax of 101 8  He has been taking acetaminophen & ibuprofen to help w/ fever  He has has some small amount of drainage form the R knee  Steristrips & bandaids in place were from F/U visit 3 weeks ago after suture removal   He denies awareness of any other symptoms that would suggest another source of fever - no nasal DC, CP, cough, dyspnea, n/v/d, urinary symptoms or other rashes  Has spent most time indoors X6 weeks    No known tick bites  No known ill contacts  PMHx: HTN, DM, pancreatitis, basal cell ca    On exam pt  Appears quite malaised  Temp is 100, Clear & moist mm  Mildly tachycardic w/o murmer  Lungs CTA  Abdomen soft & NT   No CVAT  LLE NT   RLE extended above unbuckled brace  No hip, proximal thigh, calf, ankle or foot tn   + 2 R DP & PT pulses  R knee is swollen & tense  There is circumferential tenderness - maximal anteriorly &medially  This is exquisite upon light palpation near medial joint line  Temperature is increased  Edges of vertical incision are well approximated  Steristrips in place  There is superficial wound dehiscence/ incomplete healing in the central portion of the incision  Bandaids removed had dried blood & yellow fibrinous vs purulent discharge  Erythema noted on wound edges  There was a lighter shade of erythema extending medially from central portion of wound  Fluctuance not distinctly appreciated  Ranging of knee deferred based on recent surgery & healing recommendations  Pt  Able to range foot & ankle w/ good strength  Greatest concern is for infected surgical site  Meets sepsis criteria  Uncertain how much of swelling may be effusion vs cellulitis +/- abscess  A portion of swelling may be on basis of twisting/ injury though this should not cause erythema or fever  ? Possible bacterial entry from wound margin being open  Less likely alternate source of fever - UTI, COVID, Lyme etc     Obtaining labs & xray given trauma & anticipate speaking w/ Orthopedics  Of note, pt  & his brother reside local to this campus  He had been visiting a friend near [facility] when initial injury & ED visit occurred w/ quick follow-up & procedure  Pt  Receptive for additional treatment locally or w/ his surgeon  ED Course     Temp elevated above 101 as study results were returning  Contacted our on-call Orthopedic Specialist Dr Hong Arzate who encouraged pt  F/U w/ his surgeon       With concern for active infection in/near surgical site in immunocompromised pt  Meeting sepsis criteria reached out to [facility] for transfer/ admission  Pt  Accepted to medicine service w/ plan for Orthopedic Surgery involvement  Pt  Had some nausea w/ morphine though later better relief of discomfort without nausea using hydromorphone  As he was HD stable w/ adequately controlled pain decision made for BLS transport tonight       Critical Care Time  Procedures

## 2021-07-27 LAB — B BURGDOR IGG+IGM SER-ACNC: 114

## 2021-07-28 LAB
BACTERIA WND AEROBE CULT: ABNORMAL
BACTERIA WND AEROBE CULT: ABNORMAL
GRAM STN SPEC: ABNORMAL
GRAM STN SPEC: ABNORMAL

## 2021-07-29 LAB
BACTERIA BLD CULT: ABNORMAL
GRAM STN SPEC: ABNORMAL

## 2021-07-31 LAB — BACTERIA BLD CULT: NORMAL

## 2021-10-01 ENCOUNTER — OFFICE VISIT (OUTPATIENT)
Dept: PHYSICAL THERAPY | Facility: CLINIC | Age: 56
End: 2021-10-01
Payer: COMMERCIAL

## 2021-10-01 DIAGNOSIS — S76.111S QUADRICEPS TENDON RUPTURE, RIGHT, SEQUELA: ICD-10-CM

## 2021-10-01 DIAGNOSIS — T81.40XS POSTOPERATIVE INFECTION, UNSPECIFIED TYPE, SEQUELA: Primary | ICD-10-CM

## 2021-10-01 DIAGNOSIS — Z47.89 UNSPECIFIED ORTHOPEDIC AFTERCARE: ICD-10-CM

## 2021-10-01 PROCEDURE — 97162 PT EVAL MOD COMPLEX 30 MIN: CPT | Performed by: PHYSICAL THERAPIST

## 2021-10-01 PROCEDURE — 97110 THERAPEUTIC EXERCISES: CPT | Performed by: PHYSICAL THERAPIST

## 2021-10-05 ENCOUNTER — OFFICE VISIT (OUTPATIENT)
Dept: PHYSICAL THERAPY | Facility: CLINIC | Age: 56
End: 2021-10-05
Payer: COMMERCIAL

## 2021-10-05 DIAGNOSIS — T81.40XS POSTOPERATIVE INFECTION, UNSPECIFIED TYPE, SEQUELA: ICD-10-CM

## 2021-10-05 DIAGNOSIS — Z47.89 UNSPECIFIED ORTHOPEDIC AFTERCARE: ICD-10-CM

## 2021-10-05 DIAGNOSIS — S76.111S QUADRICEPS TENDON RUPTURE, RIGHT, SEQUELA: Primary | ICD-10-CM

## 2021-10-05 PROCEDURE — 97140 MANUAL THERAPY 1/> REGIONS: CPT | Performed by: PHYSICAL THERAPIST

## 2021-10-05 PROCEDURE — 97110 THERAPEUTIC EXERCISES: CPT | Performed by: PHYSICAL THERAPIST

## 2021-10-07 ENCOUNTER — OFFICE VISIT (OUTPATIENT)
Dept: PHYSICAL THERAPY | Facility: CLINIC | Age: 56
End: 2021-10-07
Payer: COMMERCIAL

## 2021-10-07 DIAGNOSIS — S76.111S QUADRICEPS TENDON RUPTURE, RIGHT, SEQUELA: ICD-10-CM

## 2021-10-07 DIAGNOSIS — Z47.89 UNSPECIFIED ORTHOPEDIC AFTERCARE: Primary | ICD-10-CM

## 2021-10-07 DIAGNOSIS — T81.40XS POSTOPERATIVE INFECTION, UNSPECIFIED TYPE, SEQUELA: ICD-10-CM

## 2021-10-07 PROCEDURE — 97112 NEUROMUSCULAR REEDUCATION: CPT | Performed by: PHYSICAL THERAPIST

## 2021-10-07 PROCEDURE — 97140 MANUAL THERAPY 1/> REGIONS: CPT | Performed by: PHYSICAL THERAPIST

## 2021-10-07 PROCEDURE — 97110 THERAPEUTIC EXERCISES: CPT | Performed by: PHYSICAL THERAPIST

## 2021-10-08 ENCOUNTER — APPOINTMENT (OUTPATIENT)
Dept: PHYSICAL THERAPY | Facility: CLINIC | Age: 56
End: 2021-10-08
Payer: COMMERCIAL

## 2021-10-12 ENCOUNTER — OFFICE VISIT (OUTPATIENT)
Dept: PHYSICAL THERAPY | Facility: CLINIC | Age: 56
End: 2021-10-12
Payer: COMMERCIAL

## 2021-10-12 DIAGNOSIS — S76.111S QUADRICEPS TENDON RUPTURE, RIGHT, SEQUELA: ICD-10-CM

## 2021-10-12 DIAGNOSIS — Z47.89 UNSPECIFIED ORTHOPEDIC AFTERCARE: Primary | ICD-10-CM

## 2021-10-12 DIAGNOSIS — T81.40XS POSTOPERATIVE INFECTION, UNSPECIFIED TYPE, SEQUELA: ICD-10-CM

## 2021-10-12 PROCEDURE — 97140 MANUAL THERAPY 1/> REGIONS: CPT | Performed by: PHYSICAL THERAPIST

## 2021-10-12 PROCEDURE — 97110 THERAPEUTIC EXERCISES: CPT | Performed by: PHYSICAL THERAPIST

## 2021-10-15 ENCOUNTER — APPOINTMENT (OUTPATIENT)
Dept: PHYSICAL THERAPY | Facility: CLINIC | Age: 56
End: 2021-10-15
Payer: COMMERCIAL

## 2021-10-22 ENCOUNTER — APPOINTMENT (OUTPATIENT)
Dept: PHYSICAL THERAPY | Facility: CLINIC | Age: 56
End: 2021-10-22
Payer: COMMERCIAL

## 2024-01-26 ENCOUNTER — CONSULTATION (OUTPATIENT)
Dept: URBAN - METROPOLITAN AREA CLINIC 79 | Facility: CLINIC | Age: 59
End: 2024-01-26

## 2024-01-26 DIAGNOSIS — H25.813: ICD-10-CM

## 2024-01-26 DIAGNOSIS — H52.11: ICD-10-CM

## 2024-01-26 DIAGNOSIS — E11.3293: ICD-10-CM

## 2024-01-26 DIAGNOSIS — H52.12: ICD-10-CM

## 2024-01-26 DIAGNOSIS — H40.013: ICD-10-CM

## 2024-01-26 PROCEDURE — 99204 OFFICE O/P NEW MOD 45 MIN: CPT

## 2024-01-26 PROCEDURE — 92133 CPTRZD OPH DX IMG PST SGM ON: CPT | Mod: NC

## 2024-01-26 PROCEDURE — 92015 DETERMINE REFRACTIVE STATE: CPT

## 2024-01-26 PROCEDURE — 92134 CPTRZ OPH DX IMG PST SGM RTA: CPT

## 2024-01-26 ASSESSMENT — VISUAL ACUITY
OD_SC: J1+
OS_CC: 20/70
OD_CC: 20/50
OD_SC: 20/250
OS_SC: 20/200
OS_SC: J1

## 2024-01-26 ASSESSMENT — TONOMETRY
OS_IOP_MMHG: 17
OD_IOP_MMHG: 17

## 2024-02-01 ENCOUNTER — CONSULTATION (OUTPATIENT)
Dept: URBAN - METROPOLITAN AREA CLINIC 79 | Facility: CLINIC | Age: 59
End: 2024-02-01

## 2024-02-01 DIAGNOSIS — Z79.4: ICD-10-CM

## 2024-02-01 DIAGNOSIS — D31.31: ICD-10-CM

## 2024-02-01 DIAGNOSIS — E11.3393: ICD-10-CM

## 2024-02-01 DIAGNOSIS — H40.013: ICD-10-CM

## 2024-02-01 DIAGNOSIS — H25.813: ICD-10-CM

## 2024-02-01 DIAGNOSIS — H52.13: ICD-10-CM

## 2024-02-01 PROCEDURE — 92134 CPTRZ OPH DX IMG PST SGM RTA: CPT

## 2024-02-01 PROCEDURE — 92014 COMPRE OPH EXAM EST PT 1/>: CPT

## 2024-02-01 PROCEDURE — 92250 FUNDUS PHOTOGRAPHY W/I&R: CPT | Mod: NC

## 2024-02-01 ASSESSMENT — VISUAL ACUITY
OD_CC: 20/40
OD_PH: 20/25-1
OS_PH: 20/40
OS_CC: 20/50-2

## 2024-02-01 ASSESSMENT — TONOMETRY
OS_IOP_MMHG: 13
OD_IOP_MMHG: 17

## 2024-03-08 ENCOUNTER — ASCAN/IOL MASTER (OUTPATIENT)
Dept: URBAN - METROPOLITAN AREA CLINIC 79 | Facility: CLINIC | Age: 59
End: 2024-03-08

## 2024-03-08 DIAGNOSIS — H25.13: ICD-10-CM

## 2024-03-08 DIAGNOSIS — H40.013: ICD-10-CM

## 2024-03-08 PROCEDURE — 99213 OFFICE O/P EST LOW 20 MIN: CPT

## 2024-03-08 PROCEDURE — 92136 OPHTHALMIC BIOMETRY: CPT

## 2024-03-08 PROCEDURE — 92025 CPTRIZED CORNEAL TOPOGRAPHY: CPT | Mod: NC

## 2024-03-08 ASSESSMENT — TONOMETRY
OS_IOP_MMHG: 12
OD_IOP_MMHG: 14

## 2024-03-08 ASSESSMENT — VISUAL ACUITY
OS_CC: 20/50
OD_CC: 20/30+2
OS_PH: 20/25

## 2024-03-20 ENCOUNTER — SURGERY/PROCEDURE (OUTPATIENT)
Dept: URBAN - METROPOLITAN AREA SURGICAL CENTER 17 | Facility: SURGICAL CENTER | Age: 59
End: 2024-03-20

## 2024-03-20 DIAGNOSIS — H25.13: ICD-10-CM

## 2024-03-20 PROCEDURE — 66984 XCAPSL CTRC RMVL W/O ECP: CPT

## 2024-03-21 ENCOUNTER — 1 DAY POST-OP (OUTPATIENT)
Dept: URBAN - METROPOLITAN AREA CLINIC 79 | Facility: CLINIC | Age: 59
End: 2024-03-21

## 2024-03-21 DIAGNOSIS — Z96.1: ICD-10-CM

## 2024-03-21 PROCEDURE — 99024 POSTOP FOLLOW-UP VISIT: CPT

## 2024-03-21 ASSESSMENT — TONOMETRY
OS_IOP_MMHG: 14
OD_IOP_MMHG: 15

## 2024-03-21 ASSESSMENT — VISUAL ACUITY
OD_CC: 20/30
OS_SC: 20/25

## 2024-04-01 ENCOUNTER — PRE-OP/ASCAN (OUTPATIENT)
Dept: URBAN - METROPOLITAN AREA CLINIC 48 | Facility: CLINIC | Age: 59
End: 2024-04-01

## 2024-04-01 DIAGNOSIS — H25.11: ICD-10-CM

## 2024-04-01 PROCEDURE — 92136 OPHTHALMIC BIOMETRY: CPT | Mod: 26,RT

## 2024-04-01 PROCEDURE — 92012 INTRM OPH EXAM EST PATIENT: CPT | Mod: 24

## 2024-04-01 ASSESSMENT — VISUAL ACUITY
OD_CC: 20/30
OS_SC: 20/20

## 2024-04-01 ASSESSMENT — TONOMETRY
OD_IOP_MMHG: 13
OS_IOP_MMHG: 13

## 2024-04-08 ENCOUNTER — SURGERY/PROCEDURE (OUTPATIENT)
Dept: URBAN - METROPOLITAN AREA SURGICAL CENTER 17 | Facility: SURGICAL CENTER | Age: 59
End: 2024-04-08

## 2024-04-08 DIAGNOSIS — H25.11: ICD-10-CM

## 2024-04-08 PROCEDURE — MISCTIOL MISCTIOL

## 2024-04-08 PROCEDURE — 66984 XCAPSL CTRC RMVL W/O ECP: CPT | Mod: 79,RT

## 2024-04-09 ENCOUNTER — POST-OP CHECK (OUTPATIENT)
Dept: URBAN - METROPOLITAN AREA CLINIC 79 | Facility: CLINIC | Age: 59
End: 2024-04-09

## 2024-04-09 DIAGNOSIS — Z96.1: ICD-10-CM

## 2024-04-09 PROCEDURE — 99024 POSTOP FOLLOW-UP VISIT: CPT

## 2024-04-09 ASSESSMENT — VISUAL ACUITY
OS_SC: 20/20
OD_SC: 20/30+1
OD_PH: 20/20-2

## 2024-04-09 ASSESSMENT — TONOMETRY
OD_IOP_MMHG: 16
OS_IOP_MMHG: 10

## 2024-04-22 ENCOUNTER — POST-OP CHECK (OUTPATIENT)
Dept: URBAN - METROPOLITAN AREA CLINIC 48 | Facility: CLINIC | Age: 59
End: 2024-04-22

## 2024-04-22 DIAGNOSIS — Z96.1: ICD-10-CM

## 2024-04-22 PROCEDURE — 99024 POSTOP FOLLOW-UP VISIT: CPT

## 2024-04-22 ASSESSMENT — VISUAL ACUITY
OS_SC: 20/20
OD_SC: 20/25+2

## 2024-04-22 ASSESSMENT — TONOMETRY
OD_IOP_MMHG: 15
OS_IOP_MMHG: 12

## 2024-05-03 ENCOUNTER — POST-OP CHECK (OUTPATIENT)
Dept: URBAN - METROPOLITAN AREA CLINIC 79 | Facility: CLINIC | Age: 59
End: 2024-05-03

## 2024-05-03 DIAGNOSIS — Z96.1: ICD-10-CM

## 2024-05-03 PROCEDURE — 99024 POSTOP FOLLOW-UP VISIT: CPT

## 2024-05-03 ASSESSMENT — VISUAL ACUITY
OS_SC: 20/25
OD_CC: J2
OS_CC: J2
OD_SC: 20/20

## 2024-05-03 ASSESSMENT — TONOMETRY
OS_IOP_MMHG: 12
OD_IOP_MMHG: 16

## 2024-11-15 ENCOUNTER — FOLLOW UP (OUTPATIENT)
Dept: URBAN - METROPOLITAN AREA CLINIC 79 | Facility: CLINIC | Age: 59
End: 2024-11-15

## 2024-11-15 DIAGNOSIS — H40.013: ICD-10-CM

## 2024-11-15 DIAGNOSIS — H26.493: ICD-10-CM

## 2024-11-15 DIAGNOSIS — E11.3393: ICD-10-CM

## 2024-11-15 PROCEDURE — 92014 COMPRE OPH EXAM EST PT 1/>: CPT

## 2024-11-15 ASSESSMENT — VISUAL ACUITY
OD_CC: 20/20
OD_SC: 20/25-2
OS_CC: 20/25
OS_SC: 20/20-1

## 2024-11-15 ASSESSMENT — TONOMETRY
OD_IOP_MMHG: 15
OS_IOP_MMHG: 15